# Patient Record
Sex: FEMALE | Race: WHITE | Employment: OTHER | URBAN - METROPOLITAN AREA
[De-identification: names, ages, dates, MRNs, and addresses within clinical notes are randomized per-mention and may not be internally consistent; named-entity substitution may affect disease eponyms.]

---

## 2017-07-14 ENCOUNTER — HOSPITAL ENCOUNTER (OUTPATIENT)
Dept: GENERAL RADIOLOGY | Age: 72
Discharge: HOME | End: 2017-07-14
Attending: INTERNAL MEDICINE
Payer: MEDICARE

## 2017-07-14 VITALS — WEIGHT: 125 LBS | BODY MASS INDEX: 22.15 KG/M2 | HEIGHT: 63 IN

## 2017-07-14 DIAGNOSIS — K21.9 GERD (GASTROESOPHAGEAL REFLUX DISEASE): ICD-10-CM

## 2017-07-14 DIAGNOSIS — R10.9 ABDOMINAL PAIN: ICD-10-CM

## 2017-07-14 PROCEDURE — 74011000250 HC RX REV CODE- 250: Performed by: INTERNAL MEDICINE

## 2017-07-14 PROCEDURE — 74245 XR UGI/BA SWALLOW W SM BOWEL: CPT

## 2017-07-14 PROCEDURE — 74011000255 HC RX REV CODE- 255: Performed by: INTERNAL MEDICINE

## 2017-07-14 RX ADMIN — ANTACID/ANTIFLATULENT 4 G: 380; 550; 10; 10 GRANULE, EFFERVESCENT ORAL at 09:38

## 2017-07-14 RX ADMIN — BARIUM SULFATE 135 ML: 980 POWDER, FOR SUSPENSION ORAL at 09:37

## 2017-07-14 RX ADMIN — BARIUM SULFATE 55 ML: 0.6 SUSPENSION ORAL at 09:38

## 2017-07-14 RX ADMIN — BARIUM SULFATE 200 ML: 240 SUSPENSION ORAL at 10:30

## 2017-07-26 ENCOUNTER — HOSPITAL ENCOUNTER (OUTPATIENT)
Dept: MAMMOGRAPHY | Age: 72
Discharge: HOME | End: 2017-07-26
Attending: OBSTETRICS & GYNECOLOGY
Payer: MEDICARE

## 2017-07-26 DIAGNOSIS — Z12.31 VISIT FOR SCREENING MAMMOGRAM: ICD-10-CM

## 2017-07-26 PROCEDURE — 77067 SCR MAMMO BI INCL CAD: CPT

## 2017-12-22 PROBLEM — K31.A0 INTESTINAL METAPLASIA OF GASTRIC MUCOSA: Status: ACTIVE | Noted: 2017-12-22

## 2018-06-25 ENCOUNTER — HOSPITAL ENCOUNTER (OUTPATIENT)
Dept: ULTRASOUND IMAGING | Age: 73
Discharge: HOME | End: 2018-06-25
Attending: INTERNAL MEDICINE
Payer: MEDICARE

## 2018-06-25 DIAGNOSIS — Q61.02 MULTIPLE RENAL CYSTS: ICD-10-CM

## 2018-06-25 PROCEDURE — 76770 US EXAM ABDO BACK WALL COMP: CPT

## 2018-06-26 NOTE — PROGRESS NOTES
Right renal angiomyolipomas enlarged slightly. Other stable. Will need to check annually but nothing to do this at this point.

## 2018-07-10 ENCOUNTER — APPOINTMENT (OUTPATIENT)
Dept: GENERAL RADIOLOGY | Age: 73
End: 2018-07-10
Payer: MEDICARE

## 2018-07-10 ENCOUNTER — APPOINTMENT (OUTPATIENT)
Dept: ULTRASOUND IMAGING | Age: 73
End: 2018-07-10
Payer: MEDICARE

## 2018-07-10 ENCOUNTER — HOSPITAL ENCOUNTER (EMERGENCY)
Age: 73
Discharge: HOME | End: 2018-07-10
Payer: MEDICARE

## 2018-07-10 VITALS
DIASTOLIC BLOOD PRESSURE: 85 MMHG | HEART RATE: 75 BPM | OXYGEN SATURATION: 98 % | HEIGHT: 60 IN | RESPIRATION RATE: 18 BRPM | SYSTOLIC BLOOD PRESSURE: 185 MMHG | WEIGHT: 120.3 LBS | BODY MASS INDEX: 23.62 KG/M2 | TEMPERATURE: 98 F

## 2018-07-10 DIAGNOSIS — K80.20 CHOLELITHIASIS WITHOUT CHOLECYSTITIS: Primary | ICD-10-CM

## 2018-07-10 LAB
ALBUMIN SERPL-MCNC: 3.5 G/DL (ref 3.2–4.6)
ALBUMIN/GLOB SERPL: 1 {RATIO} (ref 1.2–3.5)
ALP SERPL-CCNC: 74 U/L (ref 50–136)
ALT SERPL-CCNC: 27 U/L (ref 12–65)
ANION GAP SERPL CALC-SCNC: 5 MMOL/L (ref 7–16)
AST SERPL-CCNC: 24 U/L (ref 15–37)
BASOPHILS # BLD: 0 K/UL (ref 0–0.2)
BASOPHILS NFR BLD: 0 % (ref 0–2)
BILIRUB SERPL-MCNC: 0.3 MG/DL (ref 0.2–1.1)
BUN SERPL-MCNC: 23 MG/DL (ref 8–23)
CALCIUM SERPL-MCNC: 10.1 MG/DL (ref 8.3–10.4)
CHLORIDE SERPL-SCNC: 102 MMOL/L (ref 98–107)
CO2 SERPL-SCNC: 34 MMOL/L (ref 21–32)
CREAT SERPL-MCNC: 1.12 MG/DL (ref 0.6–1)
D DIMER PPP FEU-MCNC: 0.36 UG/ML(FEU)
DIFFERENTIAL METHOD BLD: ABNORMAL
EOSINOPHIL # BLD: 0.1 K/UL (ref 0–0.8)
EOSINOPHIL NFR BLD: 1 % (ref 0.5–7.8)
ERYTHROCYTE [DISTWIDTH] IN BLOOD BY AUTOMATED COUNT: 13.2 % (ref 11.9–14.6)
GLOBULIN SER CALC-MCNC: 3.5 G/DL (ref 2.3–3.5)
GLUCOSE SERPL-MCNC: 115 MG/DL (ref 65–100)
HCT VFR BLD AUTO: 39.7 % (ref 35.8–46.3)
HGB BLD-MCNC: 12.9 G/DL (ref 11.7–15.4)
IMM GRANULOCYTES # BLD: 0 K/UL (ref 0–0.5)
IMM GRANULOCYTES NFR BLD AUTO: 0 % (ref 0–5)
LIPASE SERPL-CCNC: 131 U/L (ref 73–393)
LYMPHOCYTES # BLD: 2 K/UL (ref 0.5–4.6)
LYMPHOCYTES NFR BLD: 22 % (ref 13–44)
MCH RBC QN AUTO: 30.2 PG (ref 26.1–32.9)
MCHC RBC AUTO-ENTMCNC: 32.5 G/DL (ref 31.4–35)
MCV RBC AUTO: 93 FL (ref 79.6–97.8)
MONOCYTES # BLD: 0.5 K/UL (ref 0.1–1.3)
MONOCYTES NFR BLD: 6 % (ref 4–12)
NEUTS SEG # BLD: 6.4 K/UL (ref 1.7–8.2)
NEUTS SEG NFR BLD: 71 % (ref 43–78)
PLATELET # BLD AUTO: 232 K/UL (ref 150–450)
PMV BLD AUTO: 9.5 FL (ref 10.8–14.1)
POTASSIUM SERPL-SCNC: 3.2 MMOL/L (ref 3.5–5.1)
PROT SERPL-MCNC: 7 G/DL (ref 6.3–8.2)
RBC # BLD AUTO: 4.27 M/UL (ref 4.05–5.25)
SODIUM SERPL-SCNC: 141 MMOL/L (ref 136–145)
TROPONIN I BLD-MCNC: 0 NG/ML (ref 0.02–0.05)
WBC # BLD AUTO: 9.1 K/UL (ref 4.3–11.1)

## 2018-07-10 PROCEDURE — 84484 ASSAY OF TROPONIN QUANT: CPT

## 2018-07-10 PROCEDURE — 74011250637 HC RX REV CODE- 250/637

## 2018-07-10 PROCEDURE — 71045 X-RAY EXAM CHEST 1 VIEW: CPT

## 2018-07-10 PROCEDURE — 99284 EMERGENCY DEPT VISIT MOD MDM: CPT

## 2018-07-10 PROCEDURE — 85025 COMPLETE CBC W/AUTO DIFF WBC: CPT

## 2018-07-10 PROCEDURE — 85379 FIBRIN DEGRADATION QUANT: CPT

## 2018-07-10 PROCEDURE — 76705 ECHO EXAM OF ABDOMEN: CPT

## 2018-07-10 PROCEDURE — 83690 ASSAY OF LIPASE: CPT

## 2018-07-10 PROCEDURE — 80053 COMPREHEN METABOLIC PANEL: CPT

## 2018-07-10 PROCEDURE — 81003 URINALYSIS AUTO W/O SCOPE: CPT

## 2018-07-10 RX ORDER — HYOSCYAMINE SULFATE 0.125 MG
125 TABLET ORAL
Qty: 10 TAB | Refills: 0 | Status: SHIPPED | OUTPATIENT
Start: 2018-07-10 | End: 2018-10-22 | Stop reason: ALTCHOICE

## 2018-07-10 RX ORDER — HYOSCYAMINE SULFATE 0.12 MG/1
0.12 TABLET SUBLINGUAL
Status: COMPLETED | OUTPATIENT
Start: 2018-07-10 | End: 2018-07-10

## 2018-07-10 RX ORDER — ONDANSETRON HYDROCHLORIDE 8 MG/1
8 TABLET, FILM COATED ORAL
Qty: 12 TAB | Refills: 0 | Status: SHIPPED | OUTPATIENT
Start: 2018-07-10 | End: 2018-10-22 | Stop reason: ALTCHOICE

## 2018-07-10 RX ORDER — HYDROCODONE BITARTRATE AND ACETAMINOPHEN 7.5; 325 MG/1; MG/1
1 TABLET ORAL
Qty: 12 TAB | Refills: 0 | Status: SHIPPED | OUTPATIENT
Start: 2018-07-10 | End: 2018-10-22 | Stop reason: ALTCHOICE

## 2018-07-10 RX ORDER — ONDANSETRON 2 MG/ML
4 INJECTION INTRAMUSCULAR; INTRAVENOUS
Status: DISCONTINUED | OUTPATIENT
Start: 2018-07-10 | End: 2018-07-10

## 2018-07-10 RX ORDER — ONDANSETRON HYDROCHLORIDE 8 MG/1
8 TABLET, FILM COATED ORAL
Qty: 12 TAB | Refills: 0 | Status: SHIPPED | OUTPATIENT
Start: 2018-07-10 | End: 2018-07-10

## 2018-07-10 RX ORDER — ONDANSETRON 2 MG/ML
4 INJECTION INTRAMUSCULAR; INTRAVENOUS ONCE
Qty: 1 VIAL | Refills: 0 | Status: SHIPPED
Start: 2018-07-10 | End: 2018-07-10

## 2018-07-10 RX ORDER — HYOSCYAMINE SULFATE 0.125 MG
125 TABLET ORAL
Qty: 10 TAB | Refills: 0 | Status: SHIPPED | OUTPATIENT
Start: 2018-07-10 | End: 2018-07-10

## 2018-07-10 RX ADMIN — HYOSCYAMINE SULFATE 0.12 MG: 0.12 TABLET ORAL; SUBLINGUAL at 06:05

## 2018-07-10 NOTE — ED NOTES
I have reviewed discharge instructions with the patient and spouse. The patient and spouse verbalized understanding. Patient left ED via Discharge Method: ambulatory to Home with ). Opportunity for questions and clarification provided. Patient given 3 scripts. To continue your aftercare when you leave the hospital, you may receive an automated call from our care team to check in on how you are doing. This is a free service and part of our promise to provide the best care and service to meet your aftercare needs.  If you have questions, or wish to unsubscribe from this service please call [phone]. Thank you for Choosing our [facility].

## 2018-07-10 NOTE — PROGRESS NOTES
Radiology reporting system down for upgrade. CXR imaging report for pcxr at 5:43am: \"No acute findings\".  Per Dr. Adam Galicia

## 2018-07-10 NOTE — DISCHARGE INSTRUCTIONS
Low-Fat Diet for Gallbladder Disease: Care Instructions  Your Care Instructions    When you eat, the gallbladder releases bile, which helps you digest the fat in food. If you have an inflamed gallbladder, this may cause pain. A low-fat diet may give your gallbladder a rest so you can start to heal. Your doctor and dietitian can help you make an eating plan that does not irritate your digestive system. Always talk with your doctor or dietitian before you make changes in your diet. Follow-up care is a key part of your treatment and safety. Be sure to make and go to all appointments, and call your doctor if you are having problems. It's also a good idea to know your test results and keep a list of the medicines you take. How can you care for yourself at home? · Eat many small meals and snacks each day instead of three large meals. · Choose lean meats. ¨ Eat no more than 5 to 6½ ounces of meat a day. ¨ Cut off all fat you can see. ¨ Eat chicken and turkey without the skin. ¨ Many types of fish, such as salmon, lake trout, tuna, and herring, provide healthy omega-3 fat. But, avoid fish canned in oil, such as sardines in olive oil. ¨ Bake, broil, or grill meats, poultry, or fish instead of frying them in butter or fat. · Drink or eat nonfat or low-fat milk, yogurt, cheese, or other milk products each day. ¨ Read the labels on cheeses, and choose those with less than 5 grams of fat an ounce. ¨ Try fat-free sour cream, cream cheese, or yogurt. ¨ Avoid cream soups and cream sauces on pasta. ¨ Eat low-fat ice cream, frozen yogurt, or sorbet. Avoid regular ice cream.  · Eat whole-grain cereals, breads, crackers, rice, or pasta. Avoid high-fat foods such as croissants, scones, biscuits, waffles, doughnuts, muffins, granola, and high-fat breads. · Flavor your foods with herbs and spices (such as basil, tarragon, or mint), fat-free sauces, or lemon juice instead of butter.  You can also use butter substitutes, fat-free mayonnaise, or fat-free dressing. · Try applesauce, prune puree, or mashed bananas to replace some or all of the fat when you bake. · Limit fats and oils, such as butter, margarine, mayonnaise, and salad dressing, to no more than 1 tablespoon a meal.  · Avoid high-fat foods, such as:  ¨ Chocolate, whole milk, ice cream, and processed cheese. ¨ Fried or buttered foods. ¨ Sausage, salami, and matos. ¨ Cinnamon rolls, cakes, pies, cookies, and other pastries. ¨ Prepared snack foods, such as potato chips, nut and granola bars, and mixed nuts. ¨ Coconut and avocado. · Learn how to read food labels for serving sizes and ingredients. Fast-food and convenience-food meals often have lots of fat. Where can you learn more? Go to http://jacintaSuperDerivativespower.info/. Enter M758 in the search box to learn more about \"Low-Fat Diet for Gallbladder Disease: Care Instructions. \"  Current as of: May 12, 2017  Content Version: 11.4  © 9270-7920 [employer]. Care instructions adapted under license by [employer] (which disclaims liability or warranty for this information). If you have questions about a medical condition or this instruction, always ask your healthcare professional. [place] [address] any warranty or liability for your use of this information. Learning About Gallstones  What are gallstones? Gallstones are stones that form in the gallbladder. The gallbladder is a small sac located just under the liver. It stores bile released by the liver. Bile helps you digest fats. Gallstones can be smaller than a grain of sand or as large as a golf ball. Gallstones form when cholesterol and other things found in bile make stones. They can also form if the gallbladder doesn't empty as it should. Gallstones can also form in the common bile duct or cystic duct. These tubes carry bile from the gallbladder and the liver to the small intestine.   What happens when you have gallstones? Gallstones can cause many different problems, such as:  · A blockage in the common bile duct. · Inflammation or infection of the gallbladder (acute cholecystitis). This can happen when a gallstone blocks the cystic duct. · Inflammation or infection of the common bile duct (cholangitis). This can happen when gallstones get stuck in the common bile duct. In rare cases, this can damage the liver or spread infection. · Pancreatitis, an inflammation of the pancreas. What are the symptoms? · Most people who have gallstones don't have symptoms. · Symptoms can include:  ¨ Mild pain in the pit of your stomach or in the upper right part of your belly. It may spread to your right upper back or shoulder blade area. ¨ Severe pain that may come and go or be steady. It may get worse when you eat. ¨ Fever and chills, if a gallstone is blocking a bile duct and causing an infection. ¨ Yellowing of your skin and the whites of your eyes. How can you prevent gallstones? There is no sure way to prevent gallstones. But you can reduce your risk of forming gallstones that can cause symptoms. · Stay at a healthy weight. If you need to lose weight, do so slowly and sensibly. · Eat regular, balanced meals. · Be active, and exercise regularly. How are gallstones treated? · If you don't have symptoms, you probably don't need treatment. · For mild symptoms, your doctor may have you take pain medicine and wait to see if the pain goes away. · For severe pain or infection, or if you have more than one gallstone attack, your doctor may suggest surgery to have your gallbladder removed. The body works fine without a gallbladder. Follow-up care is a key part of your treatment and safety. Be sure to make and go to all appointments, and call your doctor if you are having problems. It's also a good idea to know your test results and keep a list of the medicines you take. Where can you learn more?   Go to http://jalyn.info/. Enter  in the search box to learn more about \"Learning About Gallstones. \"  Current as of: May 12, 2017  Content Version: 11.4  © 7521-1964 [employer]. Care instructions adapted under license by [employer] (which disclaims liability or warranty for this information). If you have questions about a medical condition or this instruction, always ask your healthcare professional. [place] [address] any warranty or liability for your use of this information.

## 2018-07-10 NOTE — ED PROVIDER NOTES
HPI Comments: 76-year-old female was awakened from sleep this morning with right subscapular chest pain and right upper quadrant pain. Patient thought she needs to burp and it got Gas-X which helped some. She had this off-and-on for several days now    Patient is a 75 y.o. female presenting with abdominal pain. The history is provided by the patient. Abdominal Pain    This is a new problem. The current episode started more than 2 days ago. The problem occurs every several days. The pain is located in the RUQ. The quality of the pain is aching. The pain is at a severity of 8/10. The pain is moderate. Associated symptoms include nausea. Pertinent negatives include no diarrhea, no hematochezia, no melena and no constipation. Nothing worsens the pain. The pain is relieved by nothing.         Past Medical History:   Diagnosis Date    Anemia     resolved    Angioleiomyoma     Arthritis     Bronchiectasis (HCC)     Bronchiectasis (HCC)     Cancer ([address]     skin, BCC    Chronic kidney disease     mild    Crohn's disease ([address]     s/p 2 surgeries    Depression     Dry eye     Fibromyalgia     Hypercholesterolemia     , HDL 55,     Hypertension     Multiple renal cysts     JASON (obstructive sleep apnea)     on cpap    Renal cysts, acquired, bilateral        Past Surgical History:   Procedure Laterality Date    HX  SECTION      2    HX COLECTOMY      Subtotal         Family History:   Problem Relation Age of Onset    Heart Disease Father     Hypertension Father     Arthritis-osteo Father     Alzheimer Mother     Cancer Maternal Grandmother      salivary glands    Bleeding Prob Sister      aneurysm    Breast Cancer Sister        Social History     Social History    Marital status:      Spouse name: N/A    Number of children: N/A    Years of education: N/A     Occupational History    retired      s/p teacher     Social History Main Topics    Smoking status: Never Smoker  Smokeless tobacco: Never Used    Alcohol use No    Drug use: No    Sexual activity: Yes     Partners: Male     Other Topics Concern    Seat Belt Yes    Self-Exams No     Social History Narrative    Denies physical or sexual abuse         ALLERGIES: Celebrex [celecoxib]; Cephalexin; Codeine; Pcn [penicillins]; and Prilosec [omeprazole]    Review of Systems   Constitutional: Negative. Negative for activity change. HENT: Negative. Eyes: Negative. Respiratory: Negative. Cardiovascular: Negative. Gastrointestinal: Positive for abdominal pain and nausea. Negative for constipation, diarrhea, hematochezia and melena. Genitourinary: Negative. Musculoskeletal: Negative. Skin: Negative. Neurological: Negative. Psychiatric/Behavioral: Negative. All other systems reviewed and are negative. Vitals:    07/10/18 0509   BP: (!) 188/92   Pulse: 70   Resp: 16   Temp: 98 °F (36.7 °C)   SpO2: 95%   Weight: 54.6 kg (120 lb 4.8 oz)   Height: 5' (1.524 m)            Physical Exam   Constitutional: She is oriented to person, place, and time. She appears well-developed and well-nourished. No distress. HENT:   Head: Normocephalic and atraumatic. Right Ear: External ear normal.   Left Ear: External ear normal.   Nose: Nose normal.   Mouth/Throat: Oropharynx is clear and moist. No oropharyngeal exudate. Eyes: Conjunctivae and EOM are normal. Pupils are equal, round, and reactive to light. Right eye exhibits no discharge. Left eye exhibits no discharge. No scleral icterus. Neck: Normal range of motion. Neck supple. No JVD present. No tracheal deviation present. Cardiovascular: Normal rate, regular rhythm and intact distal pulses. Pulmonary/Chest: Effort normal and breath sounds normal. No stridor. No respiratory distress. She has no wheezes. She exhibits no tenderness. Abdominal: Soft. Bowel sounds are normal. She exhibits no distension and no mass. There is tenderness. Musculoskeletal: Normal range of motion. She exhibits no edema or tenderness. Neurological: She is alert and oriented to person, place, and time. No cranial nerve deficit. Skin: Skin is warm and dry. No rash noted. She is not diaphoretic. No erythema. No pallor. Psychiatric: She has a normal mood and affect. Her behavior is normal. Thought content normal.   Nursing note and vitals reviewed. MDM  Number of Diagnoses or Management Options  Cholelithiasis without cholecystitis:   Diagnosis management comments: Discussed with surgery will see her in the office. Assessment: Cholelithiasis without cholecystitis or obstruction.        Amount and/or Complexity of Data Reviewed  Clinical lab tests: ordered and reviewed  Tests in the radiology section of CPT®: ordered and reviewed  Tests in the medicine section of CPT®: ordered and reviewed          ED Course       Procedures

## 2018-07-23 PROBLEM — K80.20 CALCULUS OF GALLBLADDER WITHOUT CHOLECYSTITIS: Status: ACTIVE | Noted: 2018-07-23

## 2018-07-31 ENCOUNTER — HOSPITAL ENCOUNTER (OUTPATIENT)
Dept: MAMMOGRAPHY | Age: 73
Discharge: HOME | End: 2018-07-31
Attending: OBSTETRICS & GYNECOLOGY
Payer: MEDICARE

## 2018-07-31 DIAGNOSIS — Z12.31 VISIT FOR SCREENING MAMMOGRAM: ICD-10-CM

## 2018-07-31 PROCEDURE — 77063 BREAST TOMOSYNTHESIS BI: CPT

## 2018-08-12 ENCOUNTER — HOSPITAL ENCOUNTER (OUTPATIENT)
Age: 73
Setting detail: OBSERVATION
Discharge: HOME | End: 2018-08-15
Attending: EMERGENCY MEDICINE | Admitting: SURGERY
Payer: MEDICARE

## 2018-08-12 DIAGNOSIS — K81.0 ACUTE CHOLECYSTITIS: Primary | ICD-10-CM

## 2018-08-12 LAB
ALBUMIN SERPL-MCNC: 3.7 G/DL (ref 3.2–4.6)
ALBUMIN/GLOB SERPL: 0.9 {RATIO} (ref 1.2–3.5)
ALP SERPL-CCNC: 83 U/L (ref 50–136)
ALT SERPL-CCNC: 29 U/L (ref 12–65)
ANION GAP SERPL CALC-SCNC: 10 MMOL/L (ref 7–16)
AST SERPL-CCNC: 29 U/L (ref 15–37)
BASOPHILS # BLD: 0.1 K/UL
BASOPHILS NFR BLD: 0 % (ref 0–2)
BILIRUB SERPL-MCNC: 0.5 MG/DL (ref 0.2–1.1)
BUN SERPL-MCNC: 22 MG/DL (ref 8–23)
CALCIUM SERPL-MCNC: 9.4 MG/DL (ref 8.3–10.4)
CHLORIDE SERPL-SCNC: 100 MMOL/L (ref 98–107)
CO2 SERPL-SCNC: 28 MMOL/L (ref 21–32)
CREAT SERPL-MCNC: 1.16 MG/DL (ref 0.6–1)
DIFFERENTIAL METHOD BLD: ABNORMAL
EOSINOPHIL # BLD: 0 K/UL
EOSINOPHIL NFR BLD: 0 % (ref 0.5–7.8)
ERYTHROCYTE [DISTWIDTH] IN BLOOD BY AUTOMATED COUNT: 12.5 %
GLOBULIN SER CALC-MCNC: 4.1 G/DL (ref 2.3–3.5)
GLUCOSE SERPL-MCNC: 118 MG/DL (ref 65–100)
HCT VFR BLD AUTO: 40.5 % (ref 35.8–46.3)
HGB BLD-MCNC: 13.1 G/DL (ref 11.7–15.4)
IMM GRANULOCYTES # BLD: 0.1 K/UL
IMM GRANULOCYTES NFR BLD AUTO: 0 % (ref 0–5)
LIPASE SERPL-CCNC: 116 U/L (ref 73–393)
LYMPHOCYTES # BLD: 2 K/UL
LYMPHOCYTES NFR BLD: 13 % (ref 13–44)
MCH RBC QN AUTO: 30.8 PG (ref 26.1–32.9)
MCHC RBC AUTO-ENTMCNC: 32.3 G/DL (ref 31.4–35)
MCV RBC AUTO: 95.3 FL (ref 79.6–97.8)
MONOCYTES # BLD: 0.7 K/UL
MONOCYTES NFR BLD: 4 % (ref 4–12)
NEUTS SEG # BLD: 12.9 K/UL
NEUTS SEG NFR BLD: 82 % (ref 43–78)
NRBC # BLD: 0 K/UL (ref 0–0.2)
PLATELET # BLD AUTO: 232 K/UL (ref 150–450)
PMV BLD AUTO: 9.4 FL (ref 9.4–12.3)
POTASSIUM SERPL-SCNC: 3.4 MMOL/L (ref 3.5–5.1)
PROT SERPL-MCNC: 7.8 G/DL (ref 6.3–8.2)
RBC # BLD AUTO: 4.25 M/UL (ref 4.05–5.2)
SODIUM SERPL-SCNC: 138 MMOL/L (ref 136–145)
WBC # BLD AUTO: 15.7 K/UL (ref 4.3–11.1)

## 2018-08-12 PROCEDURE — 81003 URINALYSIS AUTO W/O SCOPE: CPT | Performed by: EMERGENCY MEDICINE

## 2018-08-12 PROCEDURE — 99285 EMERGENCY DEPT VISIT HI MDM: CPT | Performed by: EMERGENCY MEDICINE

## 2018-08-12 PROCEDURE — 85025 COMPLETE CBC W/AUTO DIFF WBC: CPT

## 2018-08-12 PROCEDURE — 83690 ASSAY OF LIPASE: CPT

## 2018-08-12 PROCEDURE — 80053 COMPREHEN METABOLIC PANEL: CPT

## 2018-08-12 NOTE — IP AVS SNAPSHOT
[address] 
[phone] Patient: Brooke Gonzalez MRN: [id number] LAT:6/09/2991 A check amy indicates which time of day the medication should be taken. My Medications CHANGE how you take these medications Instructions Each Dose to Equal  
 Morning Noon Evening Bedtime  
 clorazepate 3.75 mg tablet Commonly known as:  TRANXENE What changed:  when to take this Your last dose was: Your next dose is: Take 1 Tab by mouth two (2) times daily as needed. Max Daily Amount: 7.5 mg.  
 3.75 mg  
    
   
   
   
  
 * HYDROcodone-acetaminophen 7.5-325 mg per tablet Commonly known as:  Wayna Glaze What changed:  Another medication with the same name was added. Make sure you understand how and when to take each. Your last dose was: Your next dose is: Take 1 Tab by mouth every six (6) hours as needed for Pain. Max Daily Amount: 4 Tabs. 1 Tab  
    
   
   
   
  
 * HYDROcodone-acetaminophen 5-325 mg per tablet Commonly known as:  Wayna Glaze What changed: You were already taking a medication with the same name, and this prescription was added. Make sure you understand how and when to take each. Your last dose was: Your next dose is: Take 1 Tab by mouth every four (4) hours as needed for Pain. Max Daily Amount: 6 Tabs. 1 Tab  
    
   
   
   
  
 * HYDROcodone-acetaminophen 5-325 mg per tablet Commonly known as:  Wayna Glaze What changed: You were already taking a medication with the same name, and this prescription was added. Make sure you understand how and when to take each. Your last dose was: Your next dose is: Take 1 Tab by mouth every four (4) hours as needed for Pain. Max Daily Amount: 6 Tabs. 1 Tab * Notice:   This list has 3 medication(s) that are the same as other medications prescribed for you. Read the directions carefully, and ask your doctor or other care provider to review them with you. CONTINUE taking these medications Instructions Each Dose to Equal  
 Morning Noon Evening Bedtime  
 acetaminophen 500 mg tablet Commonly known as:  TYLENOL Your last dose was: Your next dose is: Take 1,000 mg by mouth daily as needed for Pain. 1000 mg  
    
   
   
   
  
 cholecalciferol (VITAMIN D3) 5,000 unit Tab tablet Commonly known as:  VITAMIN D3 Your last dose was: Your next dose is: Take  by mouth daily. cyanocobalamin 500 mcg tablet Commonly known as:  VITAMIN B12 Your last dose was: Your next dose is: Take 500 mcg by mouth daily. 500 mcg  
    
   
   
   
  
 desvenlafaxine succinate 50 mg ER tablet Commonly known as:  PRISTIQ Your last dose was: Your next dose is: Take 1 Tab by mouth daily. 50 mg DULoxetine 30 mg capsule Commonly known as:  CYMBALTA Your last dose was: Your next dose is: Take 1 Cap by mouth daily. 30 mg  
    
   
   
   
  
 fluticasone 50 mcg/actuation nasal spray Commonly known as:  Homar Proctor Your last dose was: Your next dose is: 2 Sprays by Both Nostrils route daily. 2 Spray  
    
   
   
   
  
 hyoscyamine 0.125 mg tablet Commonly known as:  Maral Linares Your last dose was: Your next dose is: Take 1 Tab by mouth every four (4) hours as needed for Cramping. 125 mcg  
    
   
   
   
  
 lisinopril-hydroCHLOROthiazide 10-12.5 mg per tablet Commonly known as:  Mari Huitron Your last dose was: Your next dose is: Take 1 Tab by mouth daily. 1 Tab  
    
   
   
   
  
 ondansetron hcl 8 mg tablet Commonly known as:  Lina Sanchez  
   
 Your last dose was: Your next dose is: Take 1 Tab by mouth every eight (8) hours as needed for Nausea. 8 mg PEPCID 20 mg tablet Generic drug:  famotidine Your last dose was: Your next dose is: Take 20 mg by mouth daily. 20 mg  
    
   
   
   
  
 rosuvastatin 10 mg tablet Commonly known as:  CRESTOR Your last dose was: Your next dose is: Take 1 Tab by mouth nightly. 10 mg  
    
   
   
   
  
 zolpidem 5 mg tablet Commonly known as:  AMBIEN Your last dose was: Your next dose is: Take 1 Tab by mouth nightly. Max Daily Amount: 5 mg.  
 5 mg Where to Get Your Medications Information on where to get these meds will be given to you by the nurse or doctor. ! Ask your nurse or doctor about these medications HYDROcodone-acetaminophen 5-325 mg per tablet HYDROcodone-acetaminophen 5-325 mg per tablet

## 2018-08-12 NOTE — IP AVS SNAPSHOT
[address] 
[phone] Patient: Zaire Elias MRN: [id number] RWB:[id number] About your hospitalization You were admitted on:  August 13, 2018 You last received care in the:  [address] [place] [address] You were discharged on:  August 15, 2018 Why you were hospitalized Your primary diagnosis was:  Biliary Calculus With Acute Cholecystitis Follow-up Information Follow up With Details Comments Contact Mando Evans, DO In 2 weeks APPT. SEPT. 4th @ 9:30 [address] [facility] [address] 
[phone] Isaiah Phillips MD   [address] [facility] [address] 
[phone] Your Scheduled Appointments Tuesday September 04, 2018  9:30 AM EDT Global Post Op with LEENA Keller  
[place] [facility] ([address] [place] [address]) [address] [place] [id number] [phone] Discharge Orders None A check amy indicates which time of day the medication should be taken. My Medications CHANGE how you take these medications Instructions Each Dose to Equal  
 Morning Noon Evening Bedtime  
 clorazepate 3.75 mg tablet Commonly known as:  TRANXENE What changed:  when to take this Your last dose was: Your next dose is: Take 1 Tab by mouth two (2) times daily as needed. Max Daily Amount: 7.5 mg.  
 3.75 mg  
    
   
   
   
  
 * HYDROcodone-acetaminophen 7.5-325 mg per tablet Commonly known as:  Benetta Pock What changed:  Another medication with the same name was added. Make sure you understand how and when to take each. Your last dose was: Your next dose is: Take 1 Tab by mouth every six (6) hours as needed for Pain. Max Daily Amount: 4 Tabs. 1 Tab  
    
   
   
   
  
 * HYDROcodone-acetaminophen 5-325 mg per tablet Commonly known as:  Tiff Sales What changed: You were already taking a medication with the same name, and this prescription was added. Make sure you understand how and when to take each. Your last dose was: Your next dose is: Take 1 Tab by mouth every four (4) hours as needed for Pain. Max Daily Amount: 6 Tabs. 1 Tab  
    
   
   
   
  
 * HYDROcodone-acetaminophen 5-325 mg per tablet Commonly known as:  Tiff Sales What changed: You were already taking a medication with the same name, and this prescription was added. Make sure you understand how and when to take each. Your last dose was: Your next dose is: Take 1 Tab by mouth every four (4) hours as needed for Pain. Max Daily Amount: 6 Tabs. 1 Tab * Notice: This list has 3 medication(s) that are the same as other medications prescribed for you. Read the directions carefully, and ask your doctor or other care provider to review them with you. CONTINUE taking these medications Instructions Each Dose to Equal  
 Morning Noon Evening Bedtime  
 acetaminophen 500 mg tablet Commonly known as:  TYLENOL Your last dose was: Your next dose is: Take 1,000 mg by mouth daily as needed for Pain. 1000 mg  
    
   
   
   
  
 cholecalciferol (VITAMIN D3) 5,000 unit Tab tablet Commonly known as:  VITAMIN D3 Your last dose was: Your next dose is: Take  by mouth daily. cyanocobalamin 500 mcg tablet Commonly known as:  VITAMIN B12 Your last dose was: Your next dose is: Take 500 mcg by mouth daily. 500 mcg  
    
   
   
   
  
 desvenlafaxine succinate 50 mg ER tablet Commonly known as:  PRISTIQ Your last dose was: Your next dose is: Take 1 Tab by mouth daily. 50 mg DULoxetine 30 mg capsule Commonly known as:  CYMBALTA Your last dose was: Your next dose is: Take 1 Cap by mouth daily. 30 mg  
    
   
   
   
  
 fluticasone 50 mcg/actuation nasal spray Commonly known as:  Aaron De La Cruz Your last dose was: Your next dose is: 2 Sprays by Both Nostrils route daily. 2 Spray  
    
   
   
   
  
 hyoscyamine 0.125 mg tablet Commonly known as:  Margsamuela Lena[place]e Your last dose was: Your next dose is: Take 1 Tab by mouth every four (4) hours as needed for Cramping. 125 mcg  
    
   
   
   
  
 lisinopril-hydroCHLOROthiazide 10-12.5 mg per tablet Commonly known as:  Janny Trenton Your last dose was: Your next dose is: Take 1 Tab by mouth daily. 1 Tab  
    
   
   
   
  
 ondansetron hcl 8 mg tablet Commonly known as:  Liseth Rao Your last dose was: Your next dose is: Take 1 Tab by mouth every eight (8) hours as needed for Nausea. 8 mg PEPCID 20 mg tablet Generic drug:  famotidine Your last dose was: Your next dose is: Take 20 mg by mouth daily. 20 mg  
    
   
   
   
  
 rosuvastatin 10 mg tablet Commonly known as:  CRESTOR Your last dose was: Your next dose is: Take 1 Tab by mouth nightly. 10 mg  
    
   
   
   
  
 zolpidem 5 mg tablet Commonly known as:  AMBIEN Your last dose was: Your next dose is: Take 1 Tab by mouth nightly. Max Daily Amount: 5 mg.  
 5 mg Where to Get Your Medications Information on where to get these meds will be given to you by the nurse or doctor. ! Ask your nurse or doctor about these medications HYDROcodone-acetaminophen 5-325 mg per tablet HYDROcodone-acetaminophen 5-325 mg per tablet Opioid Education Prescription Opioids: What You Need to Know: Prescription opioids can be used to help relieve moderate-to-severe pain and are often prescribed following a surgery or injury, or for certain health conditions. These medications can be an important part of treatment but also come with serious risks. Opioids are strong pain medicines. Examples include hydrocodone, oxycodone, fentanyl, and morphine. Heroin is an example of an illegal opioid. It is important to work with your health care provider to make sure you are getting the safest, most effective care. WHAT ARE THE RISKS AND SIDE EFFECTS OF OPIOID USE? Prescription opioids carry serious risks of addiction and overdose, especially with prolonged use. An opioid overdose, often marked by slow breathing, can cause sudden death. The use of prescription opioids can have a number of side effects as well, even when taken as directed. · Tolerance-meaning you might need to take more of a medication for the same pain relief · Physical dependence-meaning you have symptoms of withdrawal when the medication is stopped. Withdrawal symptoms can include nausea, sweating, chills, diarrhea, stomach cramps, and muscle aches. Withdrawal can last up to several weeks, depending on which drug you took and how long you took it. · Increased sensitivity to pain · Constipation · Nausea, vomiting, and dry mouth · Sleepiness and dizziness · Confusion · Depression · Low levels of testosterone that can result in lower sex drive, energy, and strength · Itching and sweating RISKS ARE GREATER WITH:      
· History of drug misuse, substance use disorder, or overdose · Mental health conditions (such as depression or anxiety) · Sleep apnea · Older age (72 years or older) · Pregnancy Avoid alcohol while taking prescription opioids. Also, unless specifically advised by your health care provider, medications to avoid include: · Benzodiazepines (such as Xanax or Valium) · Muscle relaxants (such as Soma or Flexeril) · Hypnotics (such as Ambien or Lunesta) · Other prescription opioids KNOW YOUR OPTIONS Talk to your health care provider about ways to manage your pain that don't involve prescription opioids. Some of these options may actually work better and have fewer risks and side effects. Options may include: 
· Pain relievers such as acetaminophen, ibuprofen, and naproxen · Some medications that are also used for depression or seizures · Physical therapy and exercise · Counseling to help patients learn how to cope better with triggers of pain and stress. · Application of heat or cold compress · Massage therapy · Relaxation techniques Be Informed Make sure you know the name of your medication, how much and how often to take it, and its potential risks & side effects. IF YOU ARE PRESCRIBED OPIOIDS FOR PAIN: 
· Never take opioids in greater amounts or more often than prescribed. Remember the goal is not to be pain-free but to manage your pain at a tolerable level. · Follow up with your primary care provider to: · Work together to create a plan on how to manage your pain. · Talk about ways to help manage your pain that don't involve prescription opioids. · Talk about any and all concerns and side effects. · Help prevent misuse and abuse. · Never sell or share prescription opioids · Help prevent misuse and abuse. · Store prescription opioids in a secure place and out of reach of others (this may include visitors, children, friends, and family). · Safely dispose of unused/unwanted prescription opioids: Find your community drug take-back program or your pharmacy mail-back program, or flush them down the toilet, following guidance from the Food and Drug Administration (www.fda.gov/Drugs/ResourcesForYou). · Visit www.cdc.gov/drugoverdose to learn about the risks of opioid abuse and overdose.  
· If you believe you may be struggling with addiction, tell your health care provider and ask for guidance or call [facility] L[facility] at 5-341-451-WHSX. Discharge Instructions DISCHARGE SUMMARY from Nurse PATIENT INSTRUCTIONS: 
 
After general anesthesia or intravenous sedation, for 24 hours or while taking prescription Narcotics: · Limit your activities · Do not drive and operate hazardous machinery · Do not make important personal or business decisions · Do  not drink alcoholic beverages · If you have not urinated within 8 hours after discharge, please contact your surgeon on call. Report the following to your surgeon: 
· Excessive pain, swelling, redness or odor of or around the surgical area · Temperature over 100.5 · Nausea and vomiting lasting longer than 4 hours or if unable to take medications · Any signs of decreased circulation or nerve impairment to extremity: change in color, persistent  numbness, tingling, coldness or increase pain · Any questions What to do at Home: 
Recommended activity: Activity as tolerated, see MD order below. If you experience any of the following symptoms nausea, vomiting, chest pain, bleeding, uncontrolled pain, please follow up with MD. 
 
*  Please give a list of your current medications to your Primary Care Provider. *  Please update this list whenever your medications are discontinued, doses are 
    changed, or new medications (including over-the-counter products) are added. *  Please carry medication information at all times in case of emergency situations. These are general instructions for a healthy lifestyle: No smoking/ No tobacco products/ Avoid exposure to second hand smoke Surgeon General's Warning:  Quitting smoking now greatly reduces serious risk to your health. Obesity, smoking, and sedentary lifestyle greatly increases your risk for illness A healthy diet, regular physical exercise & weight monitoring are important for maintaining a healthy lifestyle You may be retaining fluid if you have a history of heart failure or if you experience any of the following symptoms:  Weight gain of 3 pounds or more overnight or 5 pounds in a week, increased swelling in our hands or feet or shortness of breath while lying flat in bed. Please call your doctor as soon as you notice any of these symptoms; do not wait until your next office visit. Recognize signs and symptoms of STROKE: 
 
F-face looks uneven A-arms unable to move or move unevenly S-speech slurred or non-existent T-time-call 911 as soon as signs and symptoms begin-DO NOT go Back to bed or wait to see if you get better-TIME IS BRAIN. Warning Signs of HEART ATTACK Call 911 if you have these symptoms: 
? Chest discomfort. Most heart attacks involve discomfort in the center of the chest that lasts more than a few minutes, or that goes away and comes back. It can feel like uncomfortable pressure, squeezing, fullness, or pain. ? Discomfort in other areas of the upper body. Symptoms can include pain or discomfort in one or both arms, the back, neck, jaw, or stomach. ? Shortness of breath with or without chest discomfort. ? Other signs may include breaking out in a cold sweat, nausea, or lightheadedness. Don't wait more than five minutes to call [address]! Fast action can save your life. Calling 911 is almost always the fastest way to get lifesaving treatment. Emergency Medical Services staff can begin treatment when they arrive  up to an hour sooner than if someone gets to the hospital by car. The discharge information has been reviewed with the patient. The patient verbalized understanding. Discharge medications reviewed with the patient and appropriate educational materials and side effects teaching were provided.  
___________________________________________________________________________ ________________________________________________________ Post op instructions: 
1. May shower only, no tub bathing, no hot tub, no swimming. 2. Keep incision clean with regular soap and water. 3. No lifting over 10 lbs. 4. Regular diet as tolerated. 5. May remove topical dressing on Day #2. Leave steri strips until seen in Dr. Evans's office at follow up appointment. 6. No driving on pain medicines. 7. Resume home medicines as usual.  
 
Edison Evans, DO Cholecystectomy: What to Expect at [facility] Your Recovery After your surgery, it is normal to feel weak and tired for several days after you return home. Your belly may be swollen. If you had laparoscopic surgery, you may also have pain in your shoulder for about 24 hours. You may have gas or need to burp a lot at first, and a few people get diarrhea. The diarrhea usually goes away in 2 to 4 weeks, but it may last longer. How quickly you recover depends on whether you had a laparoscopic or open surgery. · For a laparoscopic surgery, most people can go back to work or their normal routine in 1 to 2 weeks, but it may take longer, depending on the type of work you do. · For an open surgery, it will probably take 4 to 6 weeks before you get back to your normal routine. This care sheet gives you a general idea about how long it will take for you to recover. However, each person recovers at a different pace. Follow the steps below to get better as quickly as possible. How can you care for yourself at home? Activity 
  · Rest when you feel tired. Getting enough sleep will help you recover.  
  · Try to walk each day. Start out by walking a little more than you did the day before. Gradually increase the amount you walk. Walking boosts blood flow and helps prevent pneumonia and constipation.  
  · For about 2 to 4 weeks, avoid lifting anything that would make you strain.  This may include a child, heavy grocery bags and milk containers, a heavy briefcase or backpack, cat litter or dog food bags, or a vacuum .  
  · Avoid strenuous activities, such as biking, jogging, weightlifting, and aerobic exercise, until your doctor says it is okay.  
  · You may shower 24 to 48 hours after surgery, if your doctor okays it. Pat the cut (incision) dry. Do not take a bath for the first 2 weeks, or until your doctor tells you it is okay.  
  · You may drive when you are no longer taking pain medicine and can quickly move your foot from the gas pedal to the brake. You must also be able to sit comfortably for a long period of time, even if you do not plan to go far. You might get caught in traffic.  
  · For a laparoscopic surgery, most people can go back to work or their normal routine in 1 to 2 weeks, but it may take longer. For an open surgery, it will probably take 4 to 6 weeks before you get back to your normal routine.  
  · Your doctor will tell you when you can have sex again.  
 Diet 
  · Eat smaller meals more often instead of fewer larger meals. You can eat a normal diet, but avoid eating fatty foods for about 1 month. Fatty foods include hamburger, whole milk, cheese, and many snack foods. If your stomach is upset, try bland, low-fat foods like plain rice, broiled chicken, toast, and yogurt.  
  · Drink plenty of fluids (unless your doctor tells you not to).   · If you have diarrhea, try avoiding spicy foods, dairy products, fatty foods, and alcohol. You can also watch to see if specific foods cause it, and stop eating them. If the diarrhea continues for more than 2 weeks, talk to your doctor.  
  · You may notice that your bowel movements are not regular right after your surgery. This is common. Try to avoid constipation and straining with bowel movements. You may want to take a fiber supplement every day. If you have not had a bowel movement after a couple of days, ask your doctor about taking a mild laxative. Medicines   · Your doctor will tell you if and when you can restart your medicines. He or she will also give you instructions about taking any new medicines.  
  · If you take blood thinners, such as warfarin (Coumadin), clopidogrel (Plavix), or aspirin, be sure to talk to your doctor. He or she will tell you if and when to start taking those medicines again. Make sure that you understand exactly what your doctor wants you to do.  
  · Take pain medicines exactly as directed. ¨ If the doctor gave you a prescription medicine for pain, take it as prescribed. ¨ If you are not taking a prescription pain medicine, take an over-the-counter medicine such as acetaminophen (Tylenol), ibuprofen (Advil, Motrin), or naproxen (Aleve). Read and follow all instructions on the label. ¨ Do not take two or more pain medicines at the same time unless the doctor told you to. Many pain medicines contain acetaminophen, which is Tylenol. Too much Tylenol can be harmful.  
  · If you think your pain medicine is making you sick to your stomach: 
¨ Take your medicine after meals (unless your doctor tells you not to). ¨ Ask your doctor for a different pain medicine.  
  · If your doctor prescribed antibiotics, take them as directed. Do not stop taking them just because you feel better. You need to take the full course of antibiotics. Incision care 
  · If you have strips of tape on the incision, or cut, leave the tape on for a week or until it falls off.  
  · After 24 to 48 hours, wash the area daily with warm, soapy water, and pat it dry.  
  · You may have staples to hold the cut together. Keep them dry until your doctor takes them out. This is usually in 7 to 10 days.  
  · Keep the area clean and dry. You may cover it with a gauze bandage if it weeps or rubs against clothing.  Change the bandage every day.  
 Ice 
  · To reduce swelling and pain, put ice or a cold pack on your belly for 10 to 20 minutes at a time. Do this every 1 to 2 hours. Put a thin cloth between the ice and your skin. Follow-up care is a key part of your treatment and safety. Be sure to make and go to all appointments, and call your doctor if you are having problems. It's also a good idea to know your test results and keep a list of the medicines you take. When should you call for help? Call 911 anytime you think you may need emergency care. For example, call if: 
  · You passed out (lost consciousness).  
  · You are short of breath. Fernanda Caron your doctor now or seek immediate medical care if: 
  · You are sick to your stomach and cannot drink fluids.  
  · You have pain that does not get better when you take your pain medicine.  
  · You cannot pass stools or gas.  
  · You have signs of infection, such as: 
¨ Increased pain, swelling, warmth, or redness. ¨ Red streaks leading from the incision. ¨ Pus draining from the incision. ¨ A fever.  
  · Bright red blood has soaked through the bandage over your incision.  
  · You have loose stitches, or your incision comes open.  
  · You have signs of a blood clot in your leg (called a deep vein thrombosis), such as: 
¨ Pain in your calf, back of knee, thigh, or groin. ¨ Redness and swelling in your leg or groin.  
 Watch closely for any changes in your health, and be sure to contact your doctor if you have any problems. Where can you learn more? Go to http://jacinta-power.info/. Enter 692 96 326 in the search box to learn more about \"Cholecystectomy: What to Expect at Home. \" Current as of: May 12, 2017 Content Version: 11.7 © 0398-7015 [employer]. Care instructions adapted under license by [employer] (which disclaims liability or warranty for this information).  If you have questions about a medical condition or this instruction, always ask your healthcare professional. [employer] disclaims any warranty or liability for your use of this information. Secondhand Smoke: Care Instructions Your Care Instructions Secondhand smoke comes from the burning end of a cigarette, cigar, or pipe and the smoke that a smoker exhales. The smoke contains nicotine and many other harmful chemicals. Breathing secondhand smoke can cause or worsen health problems including cancer, asthma, coronary artery disease, and respiratory infections. It can make your eyes and nose burn and cause a sore throat. Secondhand smoke is especially bad for babies and young children whose lungs are still developing. Babies whose parents smoke are more likely to have ear infections, pneumonia, and bronchitis in the first few years of their lives. Secondhand smoke can make asthma symptoms worse in children. If you are pregnant, it is important that you not smoke and that you avoid secondhand smoke. You are more likely to give birth to a baby who weighs less than expected (low birth weight) if you smoke. And your baby may have a greater risk for sudden infant death syndrome (SIDS). Babies whose mothers are exposed to secondhand smoke during pregnancy have a higher risk for health problems. Follow-up care is a key part of your treatment and safety. Be sure to make and go to all appointments, and call your doctor if you are having problems. It's also a good idea to know your test results and keep a list of the medicines you take. How can you care for yourself at home? · Do not smoke or let anyone else smoke in your home. If people must smoke, ask them to go outside. · If people do smoke in your home, choose a room where you can open a window or use a fan to get the smoke outside. · Do not let anyone smoke in your car. If someone must smoke, pull over in a safe place and let him or her smoke away from the car. · Ask your employer to make sure that you have a smoke-free work area. · Make sure that your children are not exposed to secondhand smoke at day care, school, and after-school programs. · Try to choose nonsmoking bars, restaurants, and other public places when you go out. · Help your family and friends who smoke to quit by encouraging them to try. Tell them about treatment resources. Having support from others often helps. · If you smoke, quit. Quitting is hard, but there are ways to boost your chance of quitting tobacco for good. ¨ Use nicotine gum, patches, or lozenges. Call a quitline. Ask your doctor about stop-smoking programs and medicines. ¨ Keep trying. When should you call for help? Watch closely for changes in your health, and be sure to contact your doctor if you have any problems. Where can you learn more? Go to http://jacinta-power.info/. Enter L004 in the search box to learn more about \"Secondhand Smoke: Care Instructions. \" Current as of: November 29, 2017 Content Version: 11.7 © 7881-9404 [employer]. Care instructions adapted under license by [employer] (which disclaims liability or warranty for this information). If you have questions about a medical condition or this instruction, always ask your healthcare professional. [address] any warranty or liability for your use of this information. Hand-Washing: Care Instructions Your Care Instructions It is important for caregivers to wash their hands properly. This is the single best way to prevent the spread of infections. Hand-washing can help keep you from getting sick. It is easy, doesn't cost much, and it works. Make sure that you and your caregivers follow safe hand-washing routines. Caregivers may include health care workers or family members at home or in a care facility. You can talk to them about this information on hand-washing. Follow-up care is a key part of your treatment and safety.  Be sure to make and go to all appointments, and call your doctor if you are having problems. It's also a good idea to know your test results and keep a list of the medicines you take. How can you care for yourself at home? · Caregivers should wash their hands with soap and water: ¨ When their hands are dirty, especially after being exposed to body fluids. This includes blood. ¨ When their hands may have been exposed to germs that could spread infection. ¨ After they touch broken skin, sores, or wound bandages. ¨ After they use the bathroom. · At other times, caregivers can use an alcohol-based gel  or soap and water to clean hands. This should be done: ¨ Before and after any contact with you. ¨ After they take off gloves. ¨ Before they handle a device that touches your body (even if gloves are used). ¨ After they touch any objects near you, such as medical equipment, lights, or doorknobs. ¨ Before they handle medicine or prepare food. Proper hand-washing for caregivers · When using an alcohol-based gel , fill your palm with the gel. Then spread it all over your hands. Rub your hands together until they are dry. · When washing hands with soap and water: ¨ Wet your hands with running water, and apply soap. ¨ Rub your hands together to make a lather. Scrub well for at least 20 seconds. ¨ Pay special attention to your wrists, the backs of your hands, between your fingers, and under your fingernails. ¨ Rinse your hands well under running water. ¨ Use a clean towel to dry your hands, or air-dry your hands. You may want to use a clean towel as a barrier between the faucet and your clean hands when you turn off the water. · If you use bar soap, use small bars. Set the soap on a rack that lets water drain. Where can you learn more? Go to http://jacinta-power.info/. Enter S495 in the search box to learn more about \"Hand-Washing: Care Instructions. \" 
 Current as of: November 18, 2017 Content Version: 11.7 © 6819-0819 [employer]. Care instructions adapted under license by [employer] (which disclaims liability or warranty for this information). If you have questions about a medical condition or this instruction, always ask your healthcare professional. [address] any warranty or liability for your use of this information. ACO Transitions of Care Introducing [facility] [address] offers a voluntary care coordination program to provide high quality service and care to [facility] fee-for-service beneficiaries. Vinicio Reddynce was designed to help you enhance your health and well-being through the following services: ? Transitions of Care  support for individuals who are transitioning from one care setting to another (example: Hospital to home). ? Chronic and Complex Care Coordination  support for individuals and caregivers of those with serious or chronic illnesses or with more than one chronic (ongoing) condition and those who take a number of different medications. If you meet specific medical criteria, a [address] [place] [address] Rd may call you directly to coordinate your care with your primary care physician and your other care providers. For questions about the [facility] programs, please, contact your physicians office. For general questions or additional information about Accountable Care Organizations: 
Please visit www.medicare.gov/acos. html or call 1-800-MEDICARE (2-371.724.1717) TTY users should call 0-575.324.6142. Introducing [facility]! Dear Earnestine Mir: Thank you for requesting a boomtrain account. Our records indicate that you already have an active boomtrain account. You can access your account anytime at [email or web address] Travellert Did you know that you can access your hospital and ER discharge instructions at any time in SDI-Solution? You can also review all of your test results from your hospital stay or ER visit. Additional Information If you have questions, please visit the Frequently Asked Questions section of the Kraftwurxt website at [email or web address] Remember, SDI-Solution is NOT to be used for urgent needs. For medical emergencies, dial 911. Now available from your iPhone and Android! Introducing Anthony Shea As a [employer] patient, I wanted to make you aware of our electronic visit tool called Anthony Shea. Newzstand[employer] allows you to connect within minutes with a medical provider 24 hours a day, seven days a week via a mobile device or tablet or logging into a secure website from your computer. You can access Anthony Shea from anywhere in the [place]. A virtual visit might be right for you when you have a simple condition and feel like you just dont want to get out of bed, or cant get away from work for an appointment, when your regular [facility] provider is not available (evenings, weekends or holidays), or when youre out of town and need minor care. Electronic visits cost only $49 and if the Newzstand[employer] provider determines a prescription is needed to treat your condition, one can be electronically transmitted to a nearby pharmacy*. Please take a moment to enroll today if you have not already done so. The enrollment process is free and takes just a few minutes. To enroll, please download the Newzstand[employer] roseann to your tablet or phone, or visit [email or web address] to enroll on your computer. And, as an [address] [place] [address] patient with a InfiKno account, the results of your visits will be scanned into your electronic medical record and your primary care provider will be able to view the scanned results. We urge you to continue to see your regular [facility] provider for your ongoing medical care. And while your primary care provider may not be the one available when you seek a Vigor Pharmadionfin virtual visit, the peace of mind you get from getting a real diagnosis real time can be priceless. For more information on Vigor Pharmadionfin, view our Frequently Asked Questions (FAQs) at [email or web address]. Sincerely, 
 
Valentino Milks, MD 
Chief Medical Officer [address] *:  certain medications cannot be prescribed via Vigor Pharmadionfin Providers Seen During Your Hospitalization Provider Specialty Primary office phone Deandre Randall MD Emergency Medicine [phone] Maria Victoria Che MD General Surgery [phone] Ericjackelyn [address] [phone] Your Primary Care Physician (PCP) Primary Care Physician Office Phone Office Fax [facility] [phone] You are allergic to the following Allergen Reactions Levaquin (Levofloxacin) Other (comments) Joint pain for 1 year Celebrex (Celecoxib) Hives Cephalexin Hives All cephalosporins Codeine Itching Dilaudid (Hydromorphone) Itching Pcn (Penicillins) Hives Anything related. Prilosec (Omeprazole) Hives Recent Documentation Height Weight BMI OB Status Smoking Status 1.524 m 52.6 kg 22.65 kg/m2 Postmenopausal Never Smoker Emergency Contacts Name Discharge Info Relation Home Work Mobile [address] [facility]  CAREGIVER [3] Spouse [3] [phone] Shannon Cota DISCHARGE CAREGIVER [3] Daughter [21]   [phone] Patient Belongings  The following personal items are in your possession at time of discharge: 
  Dental Appliances: None  Visual Aid: Glasses      Home Medications: Sent home (did not bring)   Jewelry: Earrings, Necklace, Watch (2 necklaces) Clothing: Footwear, Pants, Shirt, Socks, Undergarments    Other Valuables: Purse Please provide this summary of care documentation to your next provider. Signatures-by signing, you are acknowledging that this After Visit Summary has been reviewed with you and you have received a copy. Patient Signature:  ____________________________________________________________ Date:  ____________________________________________________________  
  
Porfirio Naas Provider Signature:  ____________________________________________________________ Date:  ____________________________________________________________

## 2018-08-13 ENCOUNTER — APPOINTMENT (OUTPATIENT)
Dept: ULTRASOUND IMAGING | Age: 73
End: 2018-08-13
Attending: EMERGENCY MEDICINE
Payer: MEDICARE

## 2018-08-13 ENCOUNTER — ANESTHESIA EVENT (OUTPATIENT)
Dept: SURGERY | Age: 73
End: 2018-08-13
Payer: MEDICARE

## 2018-08-13 PROBLEM — K80.00 BILIARY CALCULUS WITH ACUTE CHOLECYSTITIS: Status: ACTIVE | Noted: 2018-08-13

## 2018-08-13 PROCEDURE — 74011250637 HC RX REV CODE- 250/637: Performed by: SURGERY

## 2018-08-13 PROCEDURE — 99218 HC RM OBSERVATION: CPT

## 2018-08-13 PROCEDURE — 76705 ECHO EXAM OF ABDOMEN: CPT

## 2018-08-13 PROCEDURE — 74011250636 HC RX REV CODE- 250/636: Performed by: SURGERY

## 2018-08-13 PROCEDURE — 96372 THER/PROPH/DIAG INJ SC/IM: CPT

## 2018-08-13 PROCEDURE — 74011000258 HC RX REV CODE- 258: Performed by: SURGERY

## 2018-08-13 PROCEDURE — 96365 THER/PROPH/DIAG IV INF INIT: CPT

## 2018-08-13 PROCEDURE — 96375 TX/PRO/DX INJ NEW DRUG ADDON: CPT

## 2018-08-13 RX ORDER — SODIUM CHLORIDE 0.9 % (FLUSH) 0.9 %
5-10 SYRINGE (ML) INJECTION EVERY 8 HOURS
Status: DISCONTINUED | OUTPATIENT
Start: 2018-08-13 | End: 2018-08-15 | Stop reason: HOSPADM

## 2018-08-13 RX ORDER — DESVENLAFAXINE SUCCINATE 50 MG/1
50 TABLET, EXTENDED RELEASE ORAL DAILY
Status: DISCONTINUED | OUTPATIENT
Start: 2018-08-13 | End: 2018-08-15 | Stop reason: HOSPADM

## 2018-08-13 RX ORDER — HYDROMORPHONE HYDROCHLORIDE 2 MG/ML
.5-2 INJECTION, SOLUTION INTRAMUSCULAR; INTRAVENOUS; SUBCUTANEOUS
Status: DISCONTINUED | OUTPATIENT
Start: 2018-08-13 | End: 2018-08-15 | Stop reason: HOSPADM

## 2018-08-13 RX ORDER — CLORAZEPATE DIPOTASSIUM 7.5 MG/1
3.75 TABLET ORAL
Status: DISCONTINUED | OUTPATIENT
Start: 2018-08-13 | End: 2018-08-15 | Stop reason: HOSPADM

## 2018-08-13 RX ORDER — ZOLPIDEM TARTRATE 5 MG/1
5 TABLET ORAL
Status: DISCONTINUED | OUTPATIENT
Start: 2018-08-13 | End: 2018-08-15 | Stop reason: HOSPADM

## 2018-08-13 RX ORDER — CIPROFLOXACIN 2 MG/ML
400 INJECTION, SOLUTION INTRAVENOUS EVERY 12 HOURS
Status: DISCONTINUED | OUTPATIENT
Start: 2018-08-13 | End: 2018-08-13

## 2018-08-13 RX ORDER — ONDANSETRON 2 MG/ML
4 INJECTION INTRAMUSCULAR; INTRAVENOUS
Status: DISCONTINUED | OUTPATIENT
Start: 2018-08-13 | End: 2018-08-15 | Stop reason: HOSPADM

## 2018-08-13 RX ORDER — DIPHENHYDRAMINE HYDROCHLORIDE 50 MG/ML
12.5 INJECTION, SOLUTION INTRAMUSCULAR; INTRAVENOUS
Status: DISCONTINUED | OUTPATIENT
Start: 2018-08-13 | End: 2018-08-15 | Stop reason: HOSPADM

## 2018-08-13 RX ORDER — DULOXETIN HYDROCHLORIDE 30 MG/1
30 CAPSULE, DELAYED RELEASE ORAL DAILY
Status: DISCONTINUED | OUTPATIENT
Start: 2018-08-13 | End: 2018-08-15 | Stop reason: HOSPADM

## 2018-08-13 RX ORDER — FLUTICASONE PROPIONATE 50 MCG
2 SPRAY, SUSPENSION (ML) NASAL DAILY
Status: DISCONTINUED | OUTPATIENT
Start: 2018-08-13 | End: 2018-08-15 | Stop reason: HOSPADM

## 2018-08-13 RX ORDER — LISINOPRIL AND HYDROCHLOROTHIAZIDE 10; 12.5 MG/1; MG/1
1 TABLET ORAL DAILY
Status: DISCONTINUED | OUTPATIENT
Start: 2018-08-13 | End: 2018-08-15 | Stop reason: HOSPADM

## 2018-08-13 RX ORDER — ROSUVASTATIN CALCIUM 5 MG/1
10 TABLET, COATED ORAL DAILY
Status: DISCONTINUED | OUTPATIENT
Start: 2018-08-13 | End: 2018-08-15 | Stop reason: HOSPADM

## 2018-08-13 RX ORDER — OXYCODONE HYDROCHLORIDE 5 MG/1
5-15 TABLET ORAL
Status: DISCONTINUED | OUTPATIENT
Start: 2018-08-13 | End: 2018-08-15

## 2018-08-13 RX ORDER — DEXTROSE, SODIUM CHLORIDE, AND POTASSIUM CHLORIDE 5; .45; .15 G/100ML; G/100ML; G/100ML
100 INJECTION INTRAVENOUS CONTINUOUS
Status: DISCONTINUED | OUTPATIENT
Start: 2018-08-13 | End: 2018-08-15 | Stop reason: HOSPADM

## 2018-08-13 RX ORDER — ENOXAPARIN SODIUM 100 MG/ML
40 INJECTION SUBCUTANEOUS EVERY 24 HOURS
Status: DISCONTINUED | OUTPATIENT
Start: 2018-08-13 | End: 2018-08-13

## 2018-08-13 RX ORDER — FAMOTIDINE 20 MG/1
20 TABLET, FILM COATED ORAL DAILY
COMMUNITY

## 2018-08-13 RX ORDER — SODIUM CHLORIDE 0.9 % (FLUSH) 0.9 %
5-10 SYRINGE (ML) INJECTION AS NEEDED
Status: DISCONTINUED | OUTPATIENT
Start: 2018-08-13 | End: 2018-08-15 | Stop reason: HOSPADM

## 2018-08-13 RX ORDER — FAMOTIDINE 20 MG/1
40 TABLET, FILM COATED ORAL DAILY
Status: DISCONTINUED | OUTPATIENT
Start: 2018-08-13 | End: 2018-08-15 | Stop reason: HOSPADM

## 2018-08-13 RX ADMIN — FLUTICASONE PROPIONATE 2 SPRAY: 50 SPRAY, METERED NASAL at 09:00

## 2018-08-13 RX ADMIN — FAMOTIDINE 40 MG: 20 TABLET ORAL at 08:24

## 2018-08-13 RX ADMIN — LISINOPRIL AND HYDROCHLOROTHIAZIDE 1 TABLET: 12.5; 1 TABLET ORAL at 08:23

## 2018-08-13 RX ADMIN — DESVENLAFAXINE SUCCINATE 50 MG: 50 TABLET, EXTENDED RELEASE ORAL at 17:40

## 2018-08-13 RX ADMIN — ONDANSETRON 4 MG: 2 INJECTION, SOLUTION INTRAMUSCULAR; INTRAVENOUS at 05:45

## 2018-08-13 RX ADMIN — ZOLPIDEM TARTRATE 5 MG: 5 TABLET ORAL at 21:58

## 2018-08-13 RX ADMIN — DEXTROSE MONOHYDRATE, SODIUM CHLORIDE, AND POTASSIUM CHLORIDE 100 ML/HR: 50; 4.5; 1.49 INJECTION, SOLUTION INTRAVENOUS at 22:00

## 2018-08-13 RX ADMIN — SODIUM CHLORIDE 1 G: 900 INJECTION, SOLUTION INTRAVENOUS at 04:34

## 2018-08-13 RX ADMIN — DULOXETINE HYDROCHLORIDE 30 MG: 30 CAPSULE, DELAYED RELEASE ORAL at 08:24

## 2018-08-13 RX ADMIN — ENOXAPARIN SODIUM 40 MG: 40 INJECTION, SOLUTION INTRAVENOUS; SUBCUTANEOUS at 08:24

## 2018-08-13 RX ADMIN — OXYCODONE HYDROCHLORIDE 5 MG: 5 TABLET ORAL at 04:37

## 2018-08-13 RX ADMIN — CLORAZEPATE DIPOTASSIUM 3.75 MG: 7.5 TABLET ORAL at 03:35

## 2018-08-13 RX ADMIN — Medication 10 ML: at 02:43

## 2018-08-13 RX ADMIN — DEXTROSE MONOHYDRATE, SODIUM CHLORIDE, AND POTASSIUM CHLORIDE 100 ML/HR: 50; 4.5; 1.49 INJECTION, SOLUTION INTRAVENOUS at 02:42

## 2018-08-13 RX ADMIN — ROSUVASTATIN CALCIUM 10 MG: 5 TABLET, FILM COATED ORAL at 08:24

## 2018-08-13 NOTE — ED NOTES
TRANSFER - OUT REPORT:    Verbal report given to Maria A Shaw (name) on Elyse Mitchell  being transferred to 342 (unit) for routine progression of care       Report consisted of patients Situation, Background, Assessment and   Recommendations(SBAR). Information from the following report(s) SBAR, ED Summary, [facility] and Recent Results was reviewed with the receiving nurse. Lines:   Peripheral IV 08/12/18 Left Antecubital (Active)   Site Assessment Clean, dry, & intact 8/12/2018 10:00 PM   Phlebitis Assessment 0 8/12/2018 10:00 PM   Infiltration Assessment 0 8/12/2018 10:00 PM        Opportunity for questions and clarification was provided.       Patient transported with:   transport

## 2018-08-13 NOTE — PROGRESS NOTES
To room via wheelchair, from [address] with spouse at side. Oriented to bed controls, nurse call light with pt understanding. Gown given, asst with applying.

## 2018-08-13 NOTE — ED PROVIDER NOTES
HPI Comments: 76-year-old lady presents with concerns about epigastric and right upper quadrant pain that she is worried is related to her gallbladder. She has a history of gallstones and has been told that she likely has biliary problems and will probably need surgery. She said that over the past several days on the advice of her primary care doctor she has been trying to eat some fattier foods to see if it triggers any new symptoms. She says that starting today he began to have a lot of epigastric pain that she rates as a 9 out of 10. She states she has had some mild nausea with no vomiting or diarrhea and no blood in her bowels. She has Levsin for this pain and she says she took 2 today which has only helped a little bit. She notes that by arrival to the emergency department she is feeling a little bit better but her pain is not gone. Elements of this note were created using speech recognition software. As such, errors of speech recognition may be present. Patient is a 75 y.o. female presenting with abdominal pain. The history is provided by the patient. Abdominal Pain    Associated symptoms include nausea. Pertinent negatives include no fever, no diarrhea, no vomiting, no dysuria, no hematuria, no headaches, no arthralgias, no myalgias and no chest pain.         Past Medical History:   Diagnosis Date    Anemia     resolved    Angioleiomyoma     Arthritis     Bronchiectasis (HCC)     Bronchiectasis (HCC)     Cancer (HCC)     skin, BCC    Chronic kidney disease     mild    Crohn's disease ([facility] [address]     s/p 2 surgeries    Depression     Dry eye     Fibromyalgia     Hypercholesterolemia     , HDL 55,     Hypertension     Multiple renal cysts     JASON (obstructive sleep apnea)     on cpap    Renal cysts, acquired, bilateral        Past Surgical History:   Procedure Laterality Date    HX  SECTION      2    HX COLECTOMY      Subtotal         Family History:   Problem Relation Age of Onset    Heart Disease Father     Hypertension Father     Arthritis-osteo Father     Alzheimer Mother     Cancer Maternal Grandmother      salivary glands    Bleeding Prob Sister      aneurysm    Breast Cancer Sister        Social History     Social History    Marital status:      Spouse name: N/A    Number of children: N/A    Years of education: N/A     Occupational History    retired      s/p teacher     Social History Main Topics    Smoking status: Never Smoker    Smokeless tobacco: Never Used    Alcohol use No    Drug use: No    Sexual activity: Yes     Partners: Male     Other Topics Concern    Seat Belt Yes    Self-Exams No     Social History Narrative    Denies physical or sexual abuse         ALLERGIES: Celebrex [celecoxib]; Cephalexin; Codeine; Pcn [penicillins]; and Prilosec [omeprazole]    Review of Systems   Constitutional: Negative for chills, diaphoresis and fever. HENT: Negative for congestion, rhinorrhea and sore throat. Eyes: Negative for redness and visual disturbance. Respiratory: Negative for cough, chest tightness, shortness of breath and wheezing. Cardiovascular: Negative for chest pain and palpitations. Gastrointestinal: Positive for abdominal pain and nausea. Negative for blood in stool, diarrhea and vomiting. Endocrine: Negative for polydipsia and polyuria. Genitourinary: Negative for dysuria and hematuria. Musculoskeletal: Negative for arthralgias, myalgias and neck stiffness. Skin: Negative for rash. Allergic/Immunologic: Negative for environmental allergies and food allergies. Neurological: Negative for dizziness, weakness and headaches. Hematological: Negative for adenopathy. Does not bruise/bleed easily. Psychiatric/Behavioral: Negative for confusion and sleep disturbance. The patient is not nervous/anxious.         Vitals:    08/12/18 2154   BP: 158/73   Pulse: 81   Resp: 18   Temp: 97.9 °F (36.6 °C)   SpO2: 93% Weight: 52.6 kg (116 lb)   Height: 5' (1.524 m)            Physical Exam   Constitutional: She is oriented to person, place, and time. She appears well-developed and well-nourished. HENT:   Head: Normocephalic and atraumatic. Eyes: Conjunctivae and EOM are normal. Pupils are equal, round, and reactive to light. Neck: Normal range of motion. Cardiovascular: Normal rate and regular rhythm. Pulmonary/Chest: Effort normal and breath sounds normal. No respiratory distress. She has no wheezes. She has no rales. She exhibits no tenderness. Abdominal: Soft. Bowel sounds are normal. She exhibits no distension. There is no tenderness. There is no rebound and no guarding. Musculoskeletal: Normal range of motion. She exhibits no edema or tenderness. Lymphadenopathy:     She has no cervical adenopathy. Neurological: She is alert and oriented to person, place, and time. Skin: Skin is warm and dry. Psychiatric: She has a normal mood and affect. Nursing note and vitals reviewed. MDM  Number of Diagnoses or Management Options  Diagnosis management comments: I will check an ultrasound given the fact that her white blood cell count is elevated. Ultrasound shows evidence for acute cholecystitis. I will discuss the case with surgery. 1:05 AM  I discussed the case with Dr. Jayne Hall who kindly agreed to admit the patient.         ED Course       Procedures

## 2018-08-13 NOTE — PROGRESS NOTES
Resting quietly, resp even, unlab. Eyes closed with relaxed facial expression during bedside report given to Scooter Forbes RN. Awoke with no c/o. No distress noted.

## 2018-08-13 NOTE — PROGRESS NOTES
Tranxene 3.375 cap given PO for anxiety. Agrees to call for asst to be out of bed if steadiness questionable, rationale understood.

## 2018-08-13 NOTE — PROGRESS NOTES
Zofran 4 mg given IVP slowly for c/o nausea. Head of bed elevated, no emesis. Cold, wet cloth to face. Reports pain improved, rating \"7\".

## 2018-08-13 NOTE — PROGRESS NOTES
08/13/18 0255   Dual Skin Pressure Injury Assessment   Dual Skin Pressure Injury Assessment WDL   Second Care Provider (Based on [address] [place] [address]) Houston Rodriguez RN   Skin Integumentary   Skin Integumentary (WDL) WDL

## 2018-08-13 NOTE — CONSULTS
[address]  [address], [address] [facility], [address]  Onel Garcia        History and Physical/Surgical Consult   Diana Loredo    Admit date: 2018    MRN: [id number]     : 1945     Age: 75 y.o.          2018 12:00 PM    Subjective/HPI:   This patient is a 75 y.o. seen and evaluated at the request of hospitalist.  This is a 58-year-old female with history of Crohn's disease and subtotal colectomy complaining of right upper quadrant abdominal pain. Patient states that her pain is located in the epigastric region and upper quadrant with radiation to her back. She states the pain began 1 month ago and a gallbladder ultrasound at that time showed gallstones. Over the next 2 weeks. The patient maintained a low-fat diet and remained asymptomatic with no pain. She was seen by her primary care doctor who advised her to resume her normal diet. After resuming fatty and greasy foods she began having the right upper quadrant abdominal pain once again. 2 days ago the pain became severe rated 10 out of 10 with radiation to her back. The pain is associated with headache and nausea. She was seen in the emergency department yesterday and again a gallbladder ultrasound revealed revealed gallstones gallbladder sludge and a thickened gallbladder wall suggesting acute cholecystitis. She has slightly elevated white blood cell count as well. Today she is being evaluated for acute cholecystitis. .     Review of Systems  A comprehensive review of systems was negative except for that written in the HPI.   Past Medical History:   Diagnosis Date    Anemia     resolved    Angioleiomyoma     Arthritis     Bronchiectasis (HCC)     Bronchiectasis (HCC)     Cancer (HCC)     skin, BCC    Chronic kidney disease     mild    Crohn's disease ([facility] [address]     s/p 2 surgeries    Depression     Dry eye     Fibromyalgia     Hypercholesterolemia     , HDL 55,     Hypertension  Multiple renal cysts     JASON (obstructive sleep apnea)     on cpap    Renal cysts, acquired, bilateral       Past Surgical History:   Procedure Laterality Date    HX  SECTION      2    HX COLECTOMY      Subtotal      Allergies   Allergen Reactions    Levaquin [Levofloxacin] Other (comments)     Joint pain for 1 year    Celebrex [Celecoxib] Hives    Cephalexin Hives     All cephalosporins    Codeine Itching    Dilaudid [Hydromorphone] Itching    Pcn [Penicillins] Hives     Anything related.  Prilosec [Omeprazole] Hives      Social History   Substance Use Topics    Smoking status: Never Smoker    Smokeless tobacco: Never Used    Alcohol use No      Social History     Social History Narrative    Denies physical or sexual abuse     Family History   Problem Relation Age of Onset    Heart Disease Father     Hypertension Father     Arthritis-osteo Father     Alzheimer Mother     Cancer Maternal Grandmother      salivary glands    Bleeding Prob Sister      aneurysm    Breast Cancer Sister       Prior to Admission Medications   Prescriptions Last Dose Informant Patient Reported? Taking? DULoxetine (CYMBALTA) 30 mg capsule   No Yes   Sig: Take 1 Cap by mouth daily. HYDROcodone-acetaminophen (NORCO) 7.5-325 mg per tablet   No Yes   Sig: Take 1 Tab by mouth every six (6) hours as needed for Pain. Max Daily Amount: 4 Tabs. acetaminophen (TYLENOL) 500 mg tablet   Yes Yes   Sig: Take 1,000 mg by mouth daily as needed for Pain. cholecalciferol, VITAMIN D3, (VITAMIN D3) 5,000 unit tab tablet   Yes Yes   Sig: Take  by mouth daily. clorazepate (TRANXENE) 3.75 mg tablet   No Yes   Sig: Take 1 Tab by mouth two (2) times daily as needed. Max Daily Amount: 7.5 mg. Patient taking differently: Take 3.75 mg by mouth three (3) times daily. cyanocobalamin (VITAMIN B12) 500 mcg tablet   Yes Yes   Sig: Take 500 mcg by mouth daily.    desvenlafaxine succinate (PRISTIQ) 50 mg ER tablet   No Yes Sig: Take 1 Tab by mouth daily. famotidine (PEPCID) 20 mg tablet   Yes Yes   Sig: Take 20 mg by mouth daily. fluticasone (FLONASE) 50 mcg/actuation nasal spray   No Yes   Si Sprays by Both Nostrils route daily. hyoscyamine (LEVSIN) 0.125 mg tablet   No Yes   Sig: Take 1 Tab by mouth every four (4) hours as needed for Cramping. lisinopril-hydroCHLOROthiazide (PRINZIDE, ZESTORETIC) 10-12.5 mg per tablet   No Yes   Sig: Take 1 Tab by mouth daily. ondansetron hcl (ZOFRAN) 8 mg tablet   No Yes   Sig: Take 1 Tab by mouth every eight (8) hours as needed for Nausea. rosuvastatin (CRESTOR) 10 mg tablet   No Yes   Sig: Take 1 Tab by mouth nightly. zolpidem (AMBIEN) 5 mg tablet   No Yes   Sig: Take 1 Tab by mouth nightly. Max Daily Amount: 5 mg.       Facility-Administered Medications: None     Current Facility-Administered Medications   Medication Dose Route Frequency    desvenlafaxine succinate (PRISTIQ) ER tablet 50 mg (Patient Supplied)  50 mg Oral DAILY    DULoxetine (CYMBALTA) capsule 30 mg  30 mg Oral DAILY    famotidine (PEPCID) tablet 40 mg  40 mg Oral DAILY    fluticasone (FLONASE) 50 mcg/actuation nasal spray 2 Spray  2 Spray Both Nostrils DAILY    zolpidem (AMBIEN) tablet 5 mg  5 mg Oral QHS    lisinopril-hydroCHLOROthiazide (PRINZIDE, ZESTORETIC) 10-12.5 mg per tablet 1 Tab  1 Tab Oral DAILY    dextrose 5% - 0.45% NaCl with KCl 20 mEq/L infusion  100 mL/hr IntraVENous CONTINUOUS    sodium chloride (NS) flush 5-10 mL  5-10 mL IntraVENous Q8H    sodium chloride (NS) flush 5-10 mL  5-10 mL IntraVENous PRN    ondansetron (ZOFRAN) injection 4 mg  4 mg IntraVENous Q4H PRN    diphenhydrAMINE (BENADRYL) injection 12.5 mg  12.5 mg IntraVENous Q4H PRN    enoxaparin (LOVENOX) injection 40 mg  40 mg SubCUTAneous Q24H    oxyCODONE IR (ROXICODONE) tablet 5-15 mg  5-15 mg Oral Q4H PRN    HYDROmorphone (PF) (DILAUDID) injection 0.5-2 mg  0.5-2 mg IntraVENous Q3H PRN    rosuvastatin (CRESTOR) tablet 10 mg  10 mg Oral DAILY    clorazepate (TRANXENE) tablet 3.75 mg  3.75 mg Oral BID PRN    ertapenem (INVANZ) 1 g in 0.9% sodium chloride (MBP/ADV) 50 mL  1 g IntraVENous Q24H     Objective:     Vitals:    08/13/18 0122 08/13/18 0140 08/13/18 0402 08/13/18 0747   BP: 168/70 147/77 138/82 119/73   Pulse: 80 74 83 83   Resp: 16 16 16 16   Temp: 98.8 °F (37.1 °C) 97.9 °F (36.6 °C) 98.1 °F (36.7 °C) 98.1 °F (36.7 °C)   SpO2: 99% 95% 98% 93%   Weight:       Height:         08/13 0701 - 08/13 1900  In: -   Out: 300 [Urine:300]  08/11 1901 - 08/13 0700  In: -   Out: 180 [Urine:180]  Physical Exam:   Gen- the patient is well developed and in no acute distress  HEENT- PERRL, EOMI, no scleral icterus       nose without alar flaring or epistaxis                  oral muscosa moist without cyanosis  Neck- no JVD or retractions  Lungs- resp even/unlab   Heart- RRR   Abd- Soft. Mild TTP in the RUQ. No Hays's. No rebound  Ext- warm without cyanosis. There is no lower leg edema. Skin- no jaundice or rashes  Neuro- alert and oriented x 3. No gross sensorimotor deficits are present. Data Review   Recent Labs      08/12/18   2207   WBC  15.7*   HGB  13.1   HCT  40.5   PLT  232     Recent Labs      08/12/18   2207   NA  138   K  3.4*   CL  100   CO2  28   GLU  118*   BUN  22   CREA  1.16*       Assessment:     Hospital Problems  Date Reviewed: 8/21/2017          Codes Class Noted POA    Biliary calculus with acute cholecystitis ICD-10-CM: K80.00  ICD-9-CM: 574.00  8/13/2018 Unknown            Plan:   Plan for laparoscopic cholecystectomy with possible cholangiogram tomorrow. N.p.o. at midnight  Consent for laparoscopic cholecystectomy and possible cholangiogram  Continue antibiotics    Today I discussed gallbladder disorders with the patient and her . The patient does have a history of Crohn's disease and subtotal colectomy. I have recommended a laparoscopic cholecystectomy.   I discussed the details of the procedure with him today and she will be scheduled tomorrow afternoon at the next available date and time.   --    Pretty Evans, DO

## 2018-08-13 NOTE — PROGRESS NOTES
TRANSFER - IN REPORT:    Verbal report received from Yamel Sepulveda RN on Tani Costa  being received from [address] [facility] [address] for routine progression of care      Report consisted of patients Situation, Background, Assessment and   Recommendations(SBAR). Information from the following report(s) SBAR and ED Summary was reviewed with the receiving nurse. Opportunity for questions and clarification was provided. Assessment to be completed upon patients arrival to unit and care to be assumed.

## 2018-08-13 NOTE — PROGRESS NOTES
Pt  was told to bring in pt home med that is scheduled here, he brought in pill box with no prescription label/bottle. Instructed pt and  that pharmacy would need prescription bottle in order to be able to administer pt Pristiq. Pt and  understand and the  states he will go home and get it after they speak with the doctor.

## 2018-08-13 NOTE — ED TRIAGE NOTES
Patient presents with complaints of \"having a gallbladder attack\". Patient complains of upper abdominal pain that began several hours ago, which radiates to middle back. Patient denies chest pain, SOB, N/V/D. Patient denies urinary symptoms.

## 2018-08-13 NOTE — PROGRESS NOTES
Shift assessment complete. Pt resting quietly in bed. No signs of acute distress. Alert and oriented x4. Resp even and unlabored. Pt appears a little anxious at this time Call light within reach. Pt instructed to call for assistance.

## 2018-08-13 NOTE — PROGRESS NOTES
Continues to rest quietly, awake. Assessment completed with no c/o. Waiting for newly ordered antibiotic available from [address] Reports steady standing and ambulating. No distress.

## 2018-08-13 NOTE — PROGRESS NOTES
Medicare Outpatient Observation Notice provided to the patient. Oral explanation was provided and all questions answered. Signed document placed in the medical record under media tab. Copy to patient.     DCR

## 2018-08-14 ENCOUNTER — ANESTHESIA (OUTPATIENT)
Dept: SURGERY | Age: 73
End: 2018-08-14
Payer: MEDICARE

## 2018-08-14 PROCEDURE — 77030035048 HC TRCR ENDOSC OPTCL COVD -B: Performed by: SURGERY

## 2018-08-14 PROCEDURE — 74011000250 HC RX REV CODE- 250

## 2018-08-14 PROCEDURE — 74011250636 HC RX REV CODE- 250/636: Performed by: ANESTHESIOLOGY

## 2018-08-14 PROCEDURE — 74011250636 HC RX REV CODE- 250/636

## 2018-08-14 PROCEDURE — 99218 HC RM OBSERVATION: CPT

## 2018-08-14 PROCEDURE — 77030008756 HC TU IRR SUC STRY -B: Performed by: SURGERY

## 2018-08-14 PROCEDURE — 77030032490 HC SLV COMPR SCD KNE COVD -B

## 2018-08-14 PROCEDURE — 74011250636 HC RX REV CODE- 250/636: Performed by: SURGERY

## 2018-08-14 PROCEDURE — 76210000006 HC OR PH I REC 0.5 TO 1 HR: Performed by: SURGERY

## 2018-08-14 PROCEDURE — 76060000033 HC ANESTHESIA 1 TO 1.5 HR: Performed by: SURGERY

## 2018-08-14 PROCEDURE — 96366 THER/PROPH/DIAG IV INF ADDON: CPT

## 2018-08-14 PROCEDURE — 77030020782 HC GWN BAIR PAWS FLX 3M -B: Performed by: ANESTHESIOLOGY

## 2018-08-14 PROCEDURE — 77030035220 HC TRCR ENDOSC BLNTPRT ANCHR COVD -B: Performed by: SURGERY

## 2018-08-14 PROCEDURE — 77030019908 HC STETH ESOPH SIMS -A: Performed by: ANESTHESIOLOGY

## 2018-08-14 PROCEDURE — 74011000258 HC RX REV CODE- 258: Performed by: SURGERY

## 2018-08-14 PROCEDURE — 77030018836 HC SOL IRR NACL ICUM -A: Performed by: SURGERY

## 2018-08-14 PROCEDURE — 88304 TISSUE EXAM BY PATHOLOGIST: CPT

## 2018-08-14 PROCEDURE — 77030008703 HC TU ET UNCUF COVD -A: Performed by: ANESTHESIOLOGY

## 2018-08-14 PROCEDURE — 77030012022 HC APPL CLP ENDOSC COVD -C: Performed by: SURGERY

## 2018-08-14 PROCEDURE — 77030004818 HC CATH CHOLGM TELE -B: Performed by: SURGERY

## 2018-08-14 PROCEDURE — 76010000149 HC OR TIME 1 TO 1.5 HR: Performed by: SURGERY

## 2018-08-14 PROCEDURE — 94760 N-INVAS EAR/PLS OXIMETRY 1: CPT

## 2018-08-14 PROCEDURE — 77030031139 HC SUT VCRL2 J&J -A: Performed by: SURGERY

## 2018-08-14 PROCEDURE — 77030008477 HC STYL SATN SLP COVD -A: Performed by: ANESTHESIOLOGY

## 2018-08-14 PROCEDURE — 77030035051: Performed by: SURGERY

## 2018-08-14 PROCEDURE — 77030008522 HC TBNG INSUF LAPRO STRY -B: Performed by: SURGERY

## 2018-08-14 PROCEDURE — 74011000250 HC RX REV CODE- 250: Performed by: SURGERY

## 2018-08-14 PROCEDURE — 77030016151 HC PROTCTR LNS DFOG COVD -B: Performed by: SURGERY

## 2018-08-14 PROCEDURE — 77010033678 HC OXYGEN DAILY

## 2018-08-14 PROCEDURE — 74011250637 HC RX REV CODE- 250/637: Performed by: SURGERY

## 2018-08-14 PROCEDURE — 77030009852 HC PCH RTVR ENDOSC COVD -B: Performed by: SURGERY

## 2018-08-14 PROCEDURE — 77030020255 HC SOL INJ LR 1000ML BG

## 2018-08-14 RX ORDER — LIDOCAINE HYDROCHLORIDE 10 MG/ML
0.1 INJECTION INFILTRATION; PERINEURAL AS NEEDED
Status: DISCONTINUED | OUTPATIENT
Start: 2018-08-14 | End: 2018-08-15 | Stop reason: HOSPADM

## 2018-08-14 RX ORDER — FENTANYL CITRATE 50 UG/ML
INJECTION, SOLUTION INTRAMUSCULAR; INTRAVENOUS AS NEEDED
Status: DISCONTINUED | OUTPATIENT
Start: 2018-08-14 | End: 2018-08-14 | Stop reason: HOSPADM

## 2018-08-14 RX ORDER — OXYCODONE HYDROCHLORIDE 5 MG/1
10 TABLET ORAL
Status: DISCONTINUED | OUTPATIENT
Start: 2018-08-14 | End: 2018-08-14 | Stop reason: HOSPADM

## 2018-08-14 RX ORDER — MIDAZOLAM HYDROCHLORIDE 1 MG/ML
2 INJECTION, SOLUTION INTRAMUSCULAR; INTRAVENOUS ONCE
Status: COMPLETED | OUTPATIENT
Start: 2018-08-14 | End: 2018-08-14

## 2018-08-14 RX ORDER — ONDANSETRON 2 MG/ML
INJECTION INTRAMUSCULAR; INTRAVENOUS AS NEEDED
Status: DISCONTINUED | OUTPATIENT
Start: 2018-08-14 | End: 2018-08-14 | Stop reason: HOSPADM

## 2018-08-14 RX ORDER — NALOXONE HYDROCHLORIDE 0.4 MG/ML
0.1 INJECTION, SOLUTION INTRAMUSCULAR; INTRAVENOUS; SUBCUTANEOUS AS NEEDED
Status: DISCONTINUED | OUTPATIENT
Start: 2018-08-14 | End: 2018-08-14 | Stop reason: HOSPADM

## 2018-08-14 RX ORDER — PROPOFOL 10 MG/ML
INJECTION, EMULSION INTRAVENOUS AS NEEDED
Status: DISCONTINUED | OUTPATIENT
Start: 2018-08-14 | End: 2018-08-14 | Stop reason: HOSPADM

## 2018-08-14 RX ORDER — ALBUTEROL SULFATE 0.83 MG/ML
2.5 SOLUTION RESPIRATORY (INHALATION) AS NEEDED
Status: DISCONTINUED | OUTPATIENT
Start: 2018-08-14 | End: 2018-08-14 | Stop reason: HOSPADM

## 2018-08-14 RX ORDER — ROCURONIUM BROMIDE 10 MG/ML
INJECTION, SOLUTION INTRAVENOUS AS NEEDED
Status: DISCONTINUED | OUTPATIENT
Start: 2018-08-14 | End: 2018-08-14 | Stop reason: HOSPADM

## 2018-08-14 RX ORDER — OXYCODONE HYDROCHLORIDE 5 MG/1
5 TABLET ORAL
Status: DISCONTINUED | OUTPATIENT
Start: 2018-08-14 | End: 2018-08-14 | Stop reason: HOSPADM

## 2018-08-14 RX ORDER — MIDAZOLAM HYDROCHLORIDE 1 MG/ML
2 INJECTION, SOLUTION INTRAMUSCULAR; INTRAVENOUS
Status: DISCONTINUED | OUTPATIENT
Start: 2018-08-14 | End: 2018-08-15 | Stop reason: HOSPADM

## 2018-08-14 RX ORDER — DIPHENHYDRAMINE HYDROCHLORIDE 50 MG/ML
12.5 INJECTION, SOLUTION INTRAMUSCULAR; INTRAVENOUS
Status: DISCONTINUED | OUTPATIENT
Start: 2018-08-14 | End: 2018-08-14 | Stop reason: HOSPADM

## 2018-08-14 RX ORDER — ACETAMINOPHEN 10 MG/ML
INJECTION, SOLUTION INTRAVENOUS AS NEEDED
Status: DISCONTINUED | OUTPATIENT
Start: 2018-08-14 | End: 2018-08-14 | Stop reason: HOSPADM

## 2018-08-14 RX ORDER — DEXAMETHASONE SODIUM PHOSPHATE 4 MG/ML
INJECTION, SOLUTION INTRA-ARTICULAR; INTRALESIONAL; INTRAMUSCULAR; INTRAVENOUS; SOFT TISSUE AS NEEDED
Status: DISCONTINUED | OUTPATIENT
Start: 2018-08-14 | End: 2018-08-14 | Stop reason: HOSPADM

## 2018-08-14 RX ORDER — SODIUM CHLORIDE, SODIUM LACTATE, POTASSIUM CHLORIDE, CALCIUM CHLORIDE 600; 310; 30; 20 MG/100ML; MG/100ML; MG/100ML; MG/100ML
100 INJECTION, SOLUTION INTRAVENOUS CONTINUOUS
Status: DISCONTINUED | OUTPATIENT
Start: 2018-08-14 | End: 2018-08-15 | Stop reason: HOSPADM

## 2018-08-14 RX ORDER — NEOSTIGMINE METHYLSULFATE 1 MG/ML
INJECTION INTRAVENOUS AS NEEDED
Status: DISCONTINUED | OUTPATIENT
Start: 2018-08-14 | End: 2018-08-14 | Stop reason: HOSPADM

## 2018-08-14 RX ORDER — GLYCOPYRROLATE 0.2 MG/ML
INJECTION INTRAMUSCULAR; INTRAVENOUS AS NEEDED
Status: DISCONTINUED | OUTPATIENT
Start: 2018-08-14 | End: 2018-08-14 | Stop reason: HOSPADM

## 2018-08-14 RX ORDER — LIDOCAINE HYDROCHLORIDE 20 MG/ML
INJECTION, SOLUTION EPIDURAL; INFILTRATION; INTRACAUDAL; PERINEURAL AS NEEDED
Status: DISCONTINUED | OUTPATIENT
Start: 2018-08-14 | End: 2018-08-14 | Stop reason: HOSPADM

## 2018-08-14 RX ORDER — SODIUM CHLORIDE, SODIUM LACTATE, POTASSIUM CHLORIDE, CALCIUM CHLORIDE 600; 310; 30; 20 MG/100ML; MG/100ML; MG/100ML; MG/100ML
100 INJECTION, SOLUTION INTRAVENOUS CONTINUOUS
Status: DISCONTINUED | OUTPATIENT
Start: 2018-08-14 | End: 2018-08-14 | Stop reason: HOSPADM

## 2018-08-14 RX ORDER — ACETAMINOPHEN 10 MG/ML
1000 INJECTION, SOLUTION INTRAVENOUS ONCE
Status: COMPLETED | OUTPATIENT
Start: 2018-08-14 | End: 2018-08-14

## 2018-08-14 RX ORDER — BUPIVACAINE HYDROCHLORIDE AND EPINEPHRINE 2.5; 5 MG/ML; UG/ML
INJECTION, SOLUTION EPIDURAL; INFILTRATION; INTRACAUDAL; PERINEURAL AS NEEDED
Status: DISCONTINUED | OUTPATIENT
Start: 2018-08-14 | End: 2018-08-14 | Stop reason: HOSPADM

## 2018-08-14 RX ORDER — FENTANYL CITRATE 50 UG/ML
100 INJECTION, SOLUTION INTRAMUSCULAR; INTRAVENOUS ONCE
Status: ACTIVE | OUTPATIENT
Start: 2018-08-14 | End: 2018-08-15

## 2018-08-14 RX ORDER — KETOROLAC TROMETHAMINE 30 MG/ML
INJECTION, SOLUTION INTRAMUSCULAR; INTRAVENOUS AS NEEDED
Status: DISCONTINUED | OUTPATIENT
Start: 2018-08-14 | End: 2018-08-14 | Stop reason: HOSPADM

## 2018-08-14 RX ORDER — ONDANSETRON 2 MG/ML
4 INJECTION INTRAMUSCULAR; INTRAVENOUS ONCE
Status: DISCONTINUED | OUTPATIENT
Start: 2018-08-14 | End: 2018-08-14 | Stop reason: HOSPADM

## 2018-08-14 RX ADMIN — ACETAMINOPHEN 1000 MG: 10 INJECTION, SOLUTION INTRAVENOUS at 18:07

## 2018-08-14 RX ADMIN — MIDAZOLAM HYDROCHLORIDE 2 MG: 1 INJECTION, SOLUTION INTRAMUSCULAR; INTRAVENOUS at 15:15

## 2018-08-14 RX ADMIN — FENTANYL CITRATE 100 MCG: 50 INJECTION, SOLUTION INTRAMUSCULAR; INTRAVENOUS at 15:35

## 2018-08-14 RX ADMIN — ROCURONIUM BROMIDE 30 MG: 10 INJECTION, SOLUTION INTRAVENOUS at 15:43

## 2018-08-14 RX ADMIN — KETOROLAC TROMETHAMINE 15 MG: 30 INJECTION, SOLUTION INTRAMUSCULAR; INTRAVENOUS at 16:29

## 2018-08-14 RX ADMIN — HYDROMORPHONE HYDROCHLORIDE 2 MG: 2 INJECTION, SOLUTION INTRAMUSCULAR; INTRAVENOUS; SUBCUTANEOUS at 21:18

## 2018-08-14 RX ADMIN — PROPOFOL 100 MG: 10 INJECTION, EMULSION INTRAVENOUS at 15:43

## 2018-08-14 RX ADMIN — DEXAMETHASONE SODIUM PHOSPHATE 4 MG: 4 INJECTION, SOLUTION INTRA-ARTICULAR; INTRALESIONAL; INTRAMUSCULAR; INTRAVENOUS; SOFT TISSUE at 15:43

## 2018-08-14 RX ADMIN — PROPOFOL 10 MG: 10 INJECTION, EMULSION INTRAVENOUS at 16:36

## 2018-08-14 RX ADMIN — LIDOCAINE HYDROCHLORIDE 100 MG: 20 INJECTION, SOLUTION EPIDURAL; INFILTRATION; INTRACAUDAL; PERINEURAL at 15:43

## 2018-08-14 RX ADMIN — ACETAMINOPHEN 1000 MG: 10 INJECTION, SOLUTION INTRAVENOUS at 16:46

## 2018-08-14 RX ADMIN — SODIUM CHLORIDE 1 G: 900 INJECTION, SOLUTION INTRAVENOUS at 02:57

## 2018-08-14 RX ADMIN — DEXTROSE MONOHYDRATE, SODIUM CHLORIDE, AND POTASSIUM CHLORIDE 100 ML/HR: 50; 4.5; 1.49 INJECTION, SOLUTION INTRAVENOUS at 18:07

## 2018-08-14 RX ADMIN — FENTANYL CITRATE 25 MCG: 50 INJECTION, SOLUTION INTRAMUSCULAR; INTRAVENOUS at 16:16

## 2018-08-14 RX ADMIN — FAMOTIDINE 40 MG: 20 TABLET ORAL at 08:23

## 2018-08-14 RX ADMIN — FLUTICASONE PROPIONATE 2 SPRAY: 50 SPRAY, METERED NASAL at 08:23

## 2018-08-14 RX ADMIN — ROSUVASTATIN CALCIUM 10 MG: 5 TABLET, FILM COATED ORAL at 08:23

## 2018-08-14 RX ADMIN — DULOXETINE HYDROCHLORIDE 30 MG: 30 CAPSULE, DELAYED RELEASE ORAL at 08:23

## 2018-08-14 RX ADMIN — SODIUM CHLORIDE, SODIUM LACTATE, POTASSIUM CHLORIDE, AND CALCIUM CHLORIDE: 600; 310; 30; 20 INJECTION, SOLUTION INTRAVENOUS at 15:24

## 2018-08-14 RX ADMIN — GLYCOPYRROLATE 0.4 MG: 0.2 INJECTION INTRAMUSCULAR; INTRAVENOUS at 16:30

## 2018-08-14 RX ADMIN — ONDANSETRON 4 MG: 2 INJECTION INTRAMUSCULAR; INTRAVENOUS at 15:56

## 2018-08-14 RX ADMIN — NEOSTIGMINE METHYLSULFATE 3 MG: 1 INJECTION INTRAVENOUS at 16:30

## 2018-08-14 NOTE — PROGRESS NOTES
Shift assessment complete. Pt resting quietly in bed. Alert and oriented x4. Resp even and unlabored. Pt a little nervous about surgery today. No complaints at this time. Call light within reach. Pt instructed to call for assistance.

## 2018-08-14 NOTE — ANESTHESIA PREPROCEDURE EVALUATION
Anesthetic History   No history of anesthetic complications            Review of Systems / Medical History  Patient summary reviewed and pertinent labs reviewed    Pulmonary        Sleep apnea: CPAP        Comments: Bronchiectasis - well controlled, no treatment, no Pulmonary f/u in 3 yrs.    Neuro/Psych         Psychiatric history     Cardiovascular    Hypertension: well controlled          Hyperlipidemia    Exercise tolerance: >4 METS     GI/Hepatic/Renal     GERD: well controlled          Comments: Crohn's dz - s/p partial colectomy Endo/Other        Arthritis     Other Findings              Physical Exam    Airway  Mallampati: II  TM Distance: 4 - 6 cm  Neck ROM: normal range of motion   Mouth opening: Normal     Cardiovascular    Rhythm: regular  Rate: normal         Dental  No notable dental hx       Pulmonary  Breath sounds clear to auscultation               Abdominal         Other Findings            Anesthetic Plan    ASA: 2  Anesthesia type: general            Anesthetic plan and risks discussed with: Patient

## 2018-08-14 NOTE — H&P (VIEW-ONLY)
[address]  [address] [facility], [address] [place] [address], [address] [facility]       History and Physical/Surgical Consult   Chandu Anderson    Admit date: 2018    MRN: [id number]     : 1945     Age: 75 y.o.          2018 12:00 PM    Subjective/HPI:   This patient is a 75 y.o. seen and evaluated at the request of hospitalist.  This is a 60-year-old female with history of Crohn's disease and subtotal colectomy complaining of right upper quadrant abdominal pain. Patient states that her pain is located in the epigastric region and upper quadrant with radiation to her back. She states the pain began 1 month ago and a gallbladder ultrasound at that time showed gallstones. Over the next 2 weeks. The patient maintained a low-fat diet and remained asymptomatic with no pain. She was seen by her primary care doctor who advised her to resume her normal diet. After resuming fatty and greasy foods she began having the right upper quadrant abdominal pain once again. 2 days ago the pain became severe rated 10 out of 10 with radiation to her back. The pain is associated with headache and nausea. She was seen in the emergency department yesterday and again a gallbladder ultrasound revealed revealed gallstones gallbladder sludge and a thickened gallbladder wall suggesting acute cholecystitis. She has slightly elevated white blood cell count as well. Today she is being evaluated for acute cholecystitis. .     Review of Systems  A comprehensive review of systems was negative except for that written in the HPI.   Past Medical History:   Diagnosis Date    Anemia     resolved    Angioleiomyoma     Arthritis     Bronchiectasis (HCC)     Bronchiectasis (HCC)     Cancer (HCC)     skin, BCC    Chronic kidney disease     mild    Crohn's disease ([facility] [address]     s/p 2 surgeries    Depression     Dry eye     Fibromyalgia     Hypercholesterolemia     , HDL 55,     Hypertension  Multiple renal cysts     JASON (obstructive sleep apnea)     on cpap    Renal cysts, acquired, bilateral       Past Surgical History:   Procedure Laterality Date    HX  SECTION      2    HX COLECTOMY      Subtotal      Allergies   Allergen Reactions    Levaquin [Levofloxacin] Other (comments)     Joint pain for 1 year    Celebrex [Celecoxib] Hives    Cephalexin Hives     All cephalosporins    Codeine Itching    Dilaudid [Hydromorphone] Itching    Pcn [Penicillins] Hives     Anything related.  Prilosec [Omeprazole] Hives      Social History   Substance Use Topics    Smoking status: Never Smoker    Smokeless tobacco: Never Used    Alcohol use No      Social History     Social History Narrative    Denies physical or sexual abuse     Family History   Problem Relation Age of Onset    Heart Disease Father     Hypertension Father     Arthritis-osteo Father     Alzheimer Mother     Cancer Maternal Grandmother      salivary glands    Bleeding Prob Sister      aneurysm    Breast Cancer Sister       Prior to Admission Medications   Prescriptions Last Dose Informant Patient Reported? Taking? DULoxetine (CYMBALTA) 30 mg capsule   No Yes   Sig: Take 1 Cap by mouth daily. HYDROcodone-acetaminophen (NORCO) 7.5-325 mg per tablet   No Yes   Sig: Take 1 Tab by mouth every six (6) hours as needed for Pain. Max Daily Amount: 4 Tabs. acetaminophen (TYLENOL) 500 mg tablet   Yes Yes   Sig: Take 1,000 mg by mouth daily as needed for Pain. cholecalciferol, VITAMIN D3, (VITAMIN D3) 5,000 unit tab tablet   Yes Yes   Sig: Take  by mouth daily. clorazepate (TRANXENE) 3.75 mg tablet   No Yes   Sig: Take 1 Tab by mouth two (2) times daily as needed. Max Daily Amount: 7.5 mg. Patient taking differently: Take 3.75 mg by mouth three (3) times daily. cyanocobalamin (VITAMIN B12) 500 mcg tablet   Yes Yes   Sig: Take 500 mcg by mouth daily.    desvenlafaxine succinate (PRISTIQ) 50 mg ER tablet   No Yes Sig: Take 1 Tab by mouth daily. famotidine (PEPCID) 20 mg tablet   Yes Yes   Sig: Take 20 mg by mouth daily. fluticasone (FLONASE) 50 mcg/actuation nasal spray   No Yes   Si Sprays by Both Nostrils route daily. hyoscyamine (LEVSIN) 0.125 mg tablet   No Yes   Sig: Take 1 Tab by mouth every four (4) hours as needed for Cramping. lisinopril-hydroCHLOROthiazide (PRINZIDE, ZESTORETIC) 10-12.5 mg per tablet   No Yes   Sig: Take 1 Tab by mouth daily. ondansetron hcl (ZOFRAN) 8 mg tablet   No Yes   Sig: Take 1 Tab by mouth every eight (8) hours as needed for Nausea. rosuvastatin (CRESTOR) 10 mg tablet   No Yes   Sig: Take 1 Tab by mouth nightly. zolpidem (AMBIEN) 5 mg tablet   No Yes   Sig: Take 1 Tab by mouth nightly. Max Daily Amount: 5 mg.       Facility-Administered Medications: None     Current Facility-Administered Medications   Medication Dose Route Frequency    desvenlafaxine succinate (PRISTIQ) ER tablet 50 mg (Patient Supplied)  50 mg Oral DAILY    DULoxetine (CYMBALTA) capsule 30 mg  30 mg Oral DAILY    famotidine (PEPCID) tablet 40 mg  40 mg Oral DAILY    fluticasone (FLONASE) 50 mcg/actuation nasal spray 2 Spray  2 Spray Both Nostrils DAILY    zolpidem (AMBIEN) tablet 5 mg  5 mg Oral QHS    lisinopril-hydroCHLOROthiazide (PRINZIDE, ZESTORETIC) 10-12.5 mg per tablet 1 Tab  1 Tab Oral DAILY    dextrose 5% - 0.45% NaCl with KCl 20 mEq/L infusion  100 mL/hr IntraVENous CONTINUOUS    sodium chloride (NS) flush 5-10 mL  5-10 mL IntraVENous Q8H    sodium chloride (NS) flush 5-10 mL  5-10 mL IntraVENous PRN    ondansetron (ZOFRAN) injection 4 mg  4 mg IntraVENous Q4H PRN    diphenhydrAMINE (BENADRYL) injection 12.5 mg  12.5 mg IntraVENous Q4H PRN    enoxaparin (LOVENOX) injection 40 mg  40 mg SubCUTAneous Q24H    oxyCODONE IR (ROXICODONE) tablet 5-15 mg  5-15 mg Oral Q4H PRN    HYDROmorphone (PF) (DILAUDID) injection 0.5-2 mg  0.5-2 mg IntraVENous Q3H PRN    rosuvastatin (CRESTOR) tablet 10 mg  10 mg Oral DAILY    clorazepate (TRANXENE) tablet 3.75 mg  3.75 mg Oral BID PRN    ertapenem (INVANZ) 1 g in 0.9% sodium chloride (MBP/ADV) 50 mL  1 g IntraVENous Q24H     Objective:     Vitals:    08/13/18 0122 08/13/18 0140 08/13/18 0402 08/13/18 0747   BP: 168/70 147/77 138/82 119/73   Pulse: 80 74 83 83   Resp: 16 16 16 16   Temp: 98.8 °F (37.1 °C) 97.9 °F (36.6 °C) 98.1 °F (36.7 °C) 98.1 °F (36.7 °C)   SpO2: 99% 95% 98% 93%   Weight:       Height:         08/13 0701 - 08/13 1900  In: -   Out: 300 [Urine:300]  08/11 1901 - 08/13 0700  In: -   Out: 180 [Urine:180]  Physical Exam:   Gen- the patient is well developed and in no acute distress  HEENT- PERRL, EOMI, no scleral icterus       nose without alar flaring or epistaxis                  oral muscosa moist without cyanosis  Neck- no JVD or retractions  Lungs- resp even/unlab   Heart- RRR   Abd- Soft. Mild TTP in the RUQ. No Hays's. No rebound  Ext- warm without cyanosis. There is no lower leg edema. Skin- no jaundice or rashes  Neuro- alert and oriented x 3. No gross sensorimotor deficits are present. Data Review   Recent Labs      08/12/18   2207   WBC  15.7*   HGB  13.1   HCT  40.5   PLT  232     Recent Labs      08/12/18   2207   NA  138   K  3.4*   CL  100   CO2  28   GLU  118*   BUN  22   CREA  1.16*       Assessment:     Hospital Problems  Date Reviewed: 8/21/2017          Codes Class Noted POA    Biliary calculus with acute cholecystitis ICD-10-CM: K80.00  ICD-9-CM: 574.00  8/13/2018 Unknown            Plan:   Plan for laparoscopic cholecystectomy with possible cholangiogram tomorrow. N.p.o. at midnight  Consent for laparoscopic cholecystectomy and possible cholangiogram  Continue antibiotics    Today I discussed gallbladder disorders with the patient and her . The patient does have a history of Crohn's disease and subtotal colectomy. I have recommended a laparoscopic cholecystectomy.   I discussed the details of the procedure with him today and she will be scheduled tomorrow afternoon at the next available date and time.   --    Lina Evans, DO

## 2018-08-14 NOTE — PROGRESS NOTES
TRANSFER - OUT REPORT:    Verbal report given to Kevin Eduardo RN(name) on Chandu Anderson  being transferred to PreOp(unit) for ordered procedure       Report consisted of patients Situation, Background, Assessment and   Recommendations(SBAR). Information from the following report(s) SBAR and Kardex was reviewed with the receiving nurse. Lines:   Peripheral IV 08/14/18 Left Arm (Active)   Site Assessment Clean, dry, & intact 8/14/2018  8:15 AM   Phlebitis Assessment 0 8/14/2018  8:15 AM   Infiltration Assessment 0 8/14/2018  8:15 AM   Dressing Status Clean, dry, & intact 8/14/2018  8:15 AM   Dressing Type Transparent 8/14/2018  8:15 AM   Hub Color/Line Status Infusing;Flushed 8/14/2018  3:04 AM        Opportunity for questions and clarification was provided.       Patient transported with:   [employer]

## 2018-08-14 NOTE — INTERVAL H&P NOTE
H&P Update:  Cami [place] was seen and examined. History and physical has been reviewed. The patient has been examined.  There have been no significant clinical changes since the completion of the originally dated History and Physical.    Signed By: Kristal Marinelli DO     August 14, 2018 3:07 PM

## 2018-08-14 NOTE — PROGRESS NOTES
Pt assessment completed. Pt in bed. NAD noted. Pt aware of and understands NPO status after midnight. Informed PCT of pt need for hibiclens bath before surgery tomorrow AM. Call light and essentials within reach. Pt aware to call with needs. Will monitor.

## 2018-08-14 NOTE — PROGRESS NOTES
TRANSFER - IN REPORT:    Verbal report received from Yudelka Priest (name) on Bety Kemp  being received from 3rd med-surg   (unit) for ordered procedure      Report consisted of patients Situation, Background, Assessment and   Recommendations(SBAR). Information from the following report(s) SBAR, Kardex and MAR was reviewed with the receiving nurse. Opportunity for questions and clarification was provided. Assessment completed upon patients arrival to unit and care assumed.

## 2018-08-14 NOTE — BRIEF OP NOTE
BRIEF OPERATIVE NOTE    Date of Procedure: 8/14/2018   Preoperative Diagnosis: Acute cholecystitis [K81.0]  Postoperative Diagnosis: Acute cholecystitis [K81.0]    Procedure(s):  CHOLECYSTECTOMY LAPAROSCOPIC    Surgeon(s) and Role:     * Almas Lechuga DO - Primary         Surgical Assistant: None    Surgical Staff:  Circ-1: [facility]  Scrub Tech-1: Maisha Kapoor  Scrub Tech-2: Scot Enter  Event Time In   Incision Start 1558   Incision Close 1635     Anesthesia: General   Estimated Blood Loss: Minimal  Specimens:   ID Type Source Tests Collected by Time Destination   1 : GALLBLADDER Preservative Gallbladder  Almas Lechuga DO 8/14/2018 1626 Pathology      Findings: Normal appearing liver, GB with thickened wall and pericholecystic fluid, 5 mm cystic duct.    Complications: none  Implants: * No implants in log *    Gumaro Ha

## 2018-08-14 NOTE — PROGRESS NOTES
PM Shift Assessment - Patient is alert and oriented x4. Abdomen is soft and tender. Respirations are even and unlabored. Lung sounds are clear. No complaint of pain at this time. Currently resting in a low, locked bed with call light within reach.

## 2018-08-14 NOTE — PROGRESS NOTES
Called to pt room. Pt alert to self but a little confused as to where she was for a moment she stated. Pt jumped up to go to bathroom and pulled out IV and urinated on the floor a little. Assisted pt back to bed and cleaned up. Will monitor.

## 2018-08-14 NOTE — ANESTHESIA POSTPROCEDURE EVALUATION
Post-Anesthesia Evaluation and Assessment    Patient: Yanira Larry MRN: [id number]  SSN: [id number]    YOB: 1945  Age: 75 y.o. Sex: female       Cardiovascular Function/Vital Signs  Visit Vitals    /63    Pulse 85    Temp 37.4 °C (99.3 °F)    Resp 16    Ht 5' (1.524 m)    Wt 52.6 kg (116 lb)    SpO2 95%    BMI 22.65 kg/m2       Patient is status post general anesthesia for Procedure(s):  CHOLECYSTECTOMY LAPAROSCOPIC  . Nausea/Vomiting: None    Postoperative hydration reviewed and adequate. Pain:  Pain Scale 1: Visual (08/14/18 0500)  Pain Intensity 1: 0 (08/14/18 0500)   Managed    Neurological Status: At baseline    Mental Status and Level of Consciousness: Arousable    Pulmonary Status:   O2 Device: Nasal cannula (08/14/18 1701)   Adequate oxygenation and airway patent    Complications related to anesthesia: None    Post-anesthesia assessment completed.  No concerns    Signed By: Sherif Strickland MD     August 14, 2018

## 2018-08-14 NOTE — PROGRESS NOTES
TRANSFER - IN REPORT:    Verbal report received from Ferny (name) on Tani Costa  being received from PACU (unit) for routine post - op      Report consisted of patients Situation, Background, Assessment and   Recommendations(SBAR). Information from the following report(s) SBAR, Kardex and OR Summary was reviewed with the receiving nurse. Opportunity for questions and clarification was provided. Assessment completed upon patients arrival to unit and care assumed.

## 2018-08-14 NOTE — PROGRESS NOTES
Pt arrived back from PACU. Awake, alert and oriented x4. Respirations even and unlabored, lung sounds clear bilaterally on 2L O2 via NC. Abdomen soft and tender. Abdominal lap sites c/d/i with dermabond. Pt has not voided yet. SCDs placed. Pt aware she is on regular diet. She does not want anything now except water. No c/o nausea. C/o pain rated 4/10, [facility] Pt instructed to call for help OOB to bathroom. She denies any needs, all safety measures in place.

## 2018-08-15 VITALS
HEIGHT: 60 IN | OXYGEN SATURATION: 94 % | HEART RATE: 70 BPM | SYSTOLIC BLOOD PRESSURE: 113 MMHG | BODY MASS INDEX: 22.78 KG/M2 | WEIGHT: 116 LBS | TEMPERATURE: 98.2 F | RESPIRATION RATE: 18 BRPM | DIASTOLIC BLOOD PRESSURE: 65 MMHG

## 2018-08-15 PROCEDURE — 74011250636 HC RX REV CODE- 250/636: Performed by: SURGERY

## 2018-08-15 PROCEDURE — 74011250637 HC RX REV CODE- 250/637: Performed by: SURGERY

## 2018-08-15 PROCEDURE — 99218 HC RM OBSERVATION: CPT

## 2018-08-15 PROCEDURE — 77010033678 HC OXYGEN DAILY

## 2018-08-15 PROCEDURE — 74011000258 HC RX REV CODE- 258: Performed by: SURGERY

## 2018-08-15 PROCEDURE — 94760 N-INVAS EAR/PLS OXIMETRY 1: CPT

## 2018-08-15 RX ORDER — HYDROCODONE BITARTRATE AND ACETAMINOPHEN 7.5; 325 MG/1; MG/1
1 TABLET ORAL
Status: DISCONTINUED | OUTPATIENT
Start: 2018-08-15 | End: 2018-08-15 | Stop reason: HOSPADM

## 2018-08-15 RX ORDER — HYDROCODONE BITARTRATE AND ACETAMINOPHEN 5; 325 MG/1; MG/1
1 TABLET ORAL
Qty: 40 TAB | Refills: 0 | Status: SHIPPED | OUTPATIENT
Start: 2018-08-15 | End: 2018-10-22 | Stop reason: ALTCHOICE

## 2018-08-15 RX ADMIN — FAMOTIDINE 40 MG: 20 TABLET ORAL at 08:09

## 2018-08-15 RX ADMIN — LISINOPRIL AND HYDROCHLOROTHIAZIDE 1 TABLET: 12.5; 1 TABLET ORAL at 08:09

## 2018-08-15 RX ADMIN — DULOXETINE HYDROCHLORIDE 30 MG: 30 CAPSULE, DELAYED RELEASE ORAL at 08:10

## 2018-08-15 RX ADMIN — SODIUM CHLORIDE 1 G: 900 INJECTION, SOLUTION INTRAVENOUS at 03:00

## 2018-08-15 RX ADMIN — DESVENLAFAXINE SUCCINATE 50 MG: 50 TABLET, EXTENDED RELEASE ORAL at 08:09

## 2018-08-15 RX ADMIN — ROSUVASTATIN CALCIUM 10 MG: 5 TABLET, FILM COATED ORAL at 08:10

## 2018-08-15 RX ADMIN — FLUTICASONE PROPIONATE 2 SPRAY: 50 SPRAY, METERED NASAL at 08:14

## 2018-08-15 RX ADMIN — HYDROCODONE BITARTRATE AND ACETAMINOPHEN 1 TABLET: 7.5; 325 TABLET ORAL at 08:09

## 2018-08-15 NOTE — PROGRESS NOTES
[address]  [address], [address], [address]      PLAN:OK to DC home  Follow up 2 weeks  Norco RX  Regular diet  Ween O2  Ambulate  IS Q 1 hour while awake      ASSESSMENT:  Admit Date: 8/12/2018   1 Day Post-Op  Procedure(s):  CHOLECYSTECTOMY LAPAROSCOPIC      Principal Problem:    Biliary calculus with acute cholecystitis (8/13/2018)         Starr Folk well POD#1. VSS afebrile. Incisions clean, dry and intact. Ok to DC home. OBJECTIVE:  Constitutional: Alert oriented cooperative patient in no acute distress; appears stated age   Visit Vitals    /65 (BP 1 Location: Left arm, BP Patient Position: At rest;Supine)    Pulse 70    Temp 98.2 °F (36.8 °C)    Resp 18    Ht 5' (1.524 m)    Wt 116 lb (52.6 kg)    SpO2 94%    BMI 22.65 kg/m2     Eyes:Sclera are clear. ENMT: no external lesions gross hearing normal; no obvious neck masses, no ear or lip lesions  CV: RRR. Normal perfusion  Resp: No JVD. Breathing is  non-labored; no audible wheezing. GI: soft. incisions clean and dry. Steri strips intact. Musculoskeletal: unremarkable with normal function. No embolic signs or cyanosis.    Neuro:  Oriented; moves all 4; no focal deficits  Psychiatric: normal affect and mood, no memory impairment      Patient Vitals for the past 24 hrs:   BP Temp Pulse Resp SpO2   08/15/18 0809 - - - - 94 %   08/15/18 0753 113/65 98.2 °F (36.8 °C) 70 18 90 %   08/15/18 0359 104/52 97.8 °F (36.6 °C) 67 16 90 %   08/15/18 0027 98/59 97.9 °F (36.6 °C) 75 16 91 %   08/14/18 2128 101/64 98.1 °F (36.7 °C) 76 16 92 %   08/14/18 1804 143/78 97.7 °F (36.5 °C) 75 17 95 %   08/14/18 1746 135/56 - 74 16 96 %   08/14/18 1731 153/66 - 78 16 98 %   08/14/18 1716 128/58 - 75 16 98 %   08/14/18 1711 122/59 - 78 16 97 %   08/14/18 1706 129/58 - 80 16 98 %   08/14/18 1701 130/63 99.3 °F (37.4 °C) 85 16 95 %   08/14/18 1152 130/76 98.9 °F (37.2 °C) 83 19 93 %     Labs:  Recent Labs      08/12/18   2207   WBC  15.7*   HGB  13.1   PLT  232   NA  138   K  3.4*   CL  100   CO2  28   BUN  22   CREA  1.16*   GLU  118*   TBILI  0.5   SGOT  29   ALT  29   AP  83   LPSE  116         Naoma Aimee Parker,

## 2018-08-15 NOTE — OP NOTES
[facility] REPORT    [facility]  MR#: [id number]  : 1945  ACCOUNT #: [de-identified]   DATE OF SERVICE: 2018    PREOPERATIVE DIAGNOSIS:  Acute cholecystitis. POSTOPERATIVE DIAGNOSIS:  Acute cholecystitis. PROCEDURE PERFORMED:  Laparoscopic cholecystectomy. ANESTHESIA:  General endotracheal.    SURGEON:  Laura Trejo DO    ASSISTANT:  None. COMPLICATIONS:  None. CONDITION:  Stable. COUNTS:  Sponge count, needle count and instrument count all correct x3. SPECIMEN:  Gallbladder. IMPLANTS:  None. ESTIMATED BLOOD LOSS:  Minimal.    DESCRIPTION OF PROCEDURE:  This 57-year-old female admitted with right upper quadrant abdominal pain, gallbladder ultrasound with evidence of thickened gallbladder wall, pericholecystic fluid and findings of acute cholecystitis. She is prepared for laparoscopic cholecystectomy with possible cholangiogram.  Consent was obtained, placed upon scrubbed and the procedure to the patient including potential complications to include infection, bleeding, possible bile leak, bile duct injury or possible cholangiogram.  Consent was obtained, placed on the chart. She was administered Invanz IV preoperatively. She was taken to the operative suite, placed in a supine position. General anesthesia was initiated without complication. She was then prepped and draped in sterile fashion. Timeout was taken to confirm the patient's name and proper procedure. Following this, an infraumbilical incision was planned. 0.25% Marcaine with epinephrine was used to anesthetize the skin and subcutaneous tissue. A #11 scalpel blade was used to make a skin incision, Bovie cauterization was used to dissect down to the rectus fascia. The fascia was then incised and 0 Vicryl sutures were placed as anchoring stitches. The peritoneum was elevated between tonsil schnits, entered with the Metzenbaum scissors.   A Nikolas trocar was placed into the peritoneum, secured in place and a CO2 gas was used to create a pneumoperitoneum of 15 mmHg. The laparoscope was passed into the abdomen. A brief survey revealed a normal appearing liver and the gallbladder with a thickened wall and distention. The remaining 5 mm trocars were placed in the usual fashion with no bleeding. The gallbladder was then grasped and elevated over the dome of the liver exposing the Tristan's pouch. The anterior peritoneum was then taken down medially and laterally exposing a 5 mm cystic duct. The duct was easily skeletonized and 2 clips were placed proximally, one distally and the duct was divided. Cystic artery was identified within the triangle. Two clips were placed proximally, one distally and the artery was divided. The gallbladder was dissected off of the liver bed using spot cauterization. The gallbladder was retrieved and sent to pathology for analysis. We readmitted the scope, copiously irrigated with saline until clear, ensured hemostasis on the liver bed using spot cauterization. Once hemostasis was confirmed, we removed all trocars in the usual fashion with no evidence of bleeding. The periumbilical port site was closed with a 0 Vicryl in a figure-of-eight fashion. We irrigated once again with saline until clear. We approximated the skin edges with 3-0 Vicryl in simple running fashion. Mastisol and Steri-Strips were placed off the incision. Sterile dressing applied. The patient was extubated and transferred to recovery stable. FINDINGS:  A 65-year-old female with acute cholecystitis. Laparoscopic findings revealed a normal appearing liver, gallbladder with a thickened wall and pericholecystic fluid. The cystic duct was only 5 mm in diameter. Cholangiogram was not elected. She tolerated the procedure well. DO CORDELL RAMIREZ / VANESSA  D: 08/14/2018 16:43     T: 08/14/2018 21:09  JOB #: [id number]

## 2018-08-15 NOTE — DISCHARGE INSTRUCTIONS
DISCHARGE SUMMARY from Nurse    PATIENT INSTRUCTIONS:    After general anesthesia or intravenous sedation, for 24 hours or while taking prescription Narcotics:  · Limit your activities  · Do not drive and operate hazardous machinery  · Do not make important personal or business decisions  · Do  not drink alcoholic beverages  · If you have not urinated within 8 hours after discharge, please contact your surgeon on call. Report the following to your surgeon:  · Excessive pain, swelling, redness or odor of or around the surgical area  · Temperature over 100.5  · Nausea and vomiting lasting longer than 4 hours or if unable to take medications  · Any signs of decreased circulation or nerve impairment to extremity: change in color, persistent  numbness, tingling, coldness or increase pain  · Any questions    What to do at Home:  Recommended activity: Activity as tolerated, see MD order below. If you experience any of the following symptoms nausea, vomiting, chest pain, bleeding, uncontrolled pain, please follow up with MD.    *  Please give a list of your current medications to your Primary Care Provider. *  Please update this list whenever your medications are discontinued, doses are      changed, or new medications (including over-the-counter products) are added. *  Please carry medication information at all times in case of emergency situations. These are general instructions for a healthy lifestyle:    No smoking/ No tobacco products/ Avoid exposure to second hand smoke  Surgeon General's Warning:  Quitting smoking now greatly reduces serious risk to your health.     Obesity, smoking, and sedentary lifestyle greatly increases your risk for illness    A healthy diet, regular physical exercise & weight monitoring are important for maintaining a healthy lifestyle    You may be retaining fluid if you have a history of heart failure or if you experience any of the following symptoms:  Weight gain of 3 pounds or more overnight or 5 pounds in a week, increased swelling in our hands or feet or shortness of breath while lying flat in bed. Please call your doctor as soon as you notice any of these symptoms; do not wait until your next office visit. Recognize signs and symptoms of STROKE:    F-face looks uneven    A-arms unable to move or move unevenly    S-speech slurred or non-existent    T-time-call 911 as soon as signs and symptoms begin-DO NOT go       Back to bed or wait to see if you get better-TIME IS BRAIN. Warning Signs of HEART ATTACK     Call 911 if you have these symptoms:   Chest discomfort. Most heart attacks involve discomfort in the center of the chest that lasts more than a few minutes, or that goes away and comes back. It can feel like uncomfortable pressure, squeezing, fullness, or pain.  Discomfort in other areas of the upper body. Symptoms can include pain or discomfort in one or both arms, the back, neck, jaw, or stomach.  Shortness of breath with or without chest discomfort.  Other signs may include breaking out in a cold sweat, nausea, or lightheadedness. Don't wait more than five minutes to call 911 - MINUTES MATTER! Fast action can save your life. Calling 911 is almost always the fastest way to get lifesaving treatment. Emergency Medical Services staff can begin treatment when they arrive -- up to an hour sooner than if someone gets to the hospital by car. The discharge information has been reviewed with the patient. The patient verbalized understanding. Discharge medications reviewed with the patient and appropriate educational materials and side effects teaching were provided. ___________________________________________________________________________________________________________________________________  Post op instructions:  1. May shower only, no tub bathing, no hot tub, no swimming. 2. Keep incision clean with regular soap and water.   3. No lifting over 10 lbs.  4. Regular diet as tolerated. 5. May remove topical dressing on Day #2. Leave steri strips until seen in Dr. Evans's office at follow up appointment. 6. No driving on pain medicines. 7. Resume home medicines as usual.     Florentina Mcardle, DO               Cholecystectomy: What to Expect at [address] [place] [address]  After your surgery, it is normal to feel weak and tired for several days after you return home. Your belly may be swollen. If you had laparoscopic surgery, you may also have pain in your shoulder for about 24 hours. You may have gas or need to burp a lot at first, and a few people get diarrhea. The diarrhea usually goes away in 2 to 4 weeks, but it may last longer. How quickly you recover depends on whether you had a laparoscopic or open surgery. · For a laparoscopic surgery, most people can go back to work or their normal routine in 1 to 2 weeks, but it may take longer, depending on the type of work you do. · For an open surgery, it will probably take 4 to 6 weeks before you get back to your normal routine. This care sheet gives you a general idea about how long it will take for you to recover. However, each person recovers at a different pace. Follow the steps below to get better as quickly as possible. How can you care for yourself at home? Activity    · Rest when you feel tired. Getting enough sleep will help you recover.     · Try to walk each day. Start out by walking a little more than you did the day before. Gradually increase the amount you walk. Walking boosts blood flow and helps prevent pneumonia and constipation.     · For about 2 to 4 weeks, avoid lifting anything that would make you strain.  This may include a child, heavy grocery bags and milk containers, a heavy briefcase or backpack, cat litter or dog food bags, or a vacuum .     · Avoid strenuous activities, such as biking, jogging, weightlifting, and aerobic exercise, until your doctor says it is okay.     · You may shower 24 to 48 hours after surgery, if your doctor okays it. Pat the cut (incision) dry. Do not take a bath for the first 2 weeks, or until your doctor tells you it is okay.     · You may drive when you are no longer taking pain medicine and can quickly move your foot from the gas pedal to the brake. You must also be able to sit comfortably for a long period of time, even if you do not plan to go far. You might get caught in traffic.     · For a laparoscopic surgery, most people can go back to work or their normal routine in 1 to 2 weeks, but it may take longer. For an open surgery, it will probably take 4 to 6 weeks before you get back to your normal routine.     · Your doctor will tell you when you can have sex again.    Diet    · Eat smaller meals more often instead of fewer larger meals. You can eat a normal diet, but avoid eating fatty foods for about 1 month. Fatty foods include hamburger, whole milk, cheese, and many snack foods. If your stomach is upset, try bland, low-fat foods like plain rice, broiled chicken, toast, and yogurt.     · Drink plenty of fluids (unless your doctor tells you not to).   · If you have diarrhea, try avoiding spicy foods, dairy products, fatty foods, and alcohol. You can also watch to see if specific foods cause it, and stop eating them. If the diarrhea continues for more than 2 weeks, talk to your doctor.     · You may notice that your bowel movements are not regular right after your surgery. This is common. Try to avoid constipation and straining with bowel movements. You may want to take a fiber supplement every day. If you have not had a bowel movement after a couple of days, ask your doctor about taking a mild laxative. Medicines    · Your doctor will tell you if and when you can restart your medicines.  He or she will also give you instructions about taking any new medicines.     · If you take blood thinners, such as warfarin (Coumadin), clopidogrel (Plavix), or aspirin, be sure to talk to your doctor. He or she will tell you if and when to start taking those medicines again. Make sure that you understand exactly what your doctor wants you to do.     · Take pain medicines exactly as directed. ¨ If the doctor gave you a prescription medicine for pain, take it as prescribed. ¨ If you are not taking a prescription pain medicine, take an over-the-counter medicine such as acetaminophen (Tylenol), ibuprofen (Advil, Motrin), or naproxen (Aleve). Read and follow all instructions on the label. ¨ Do not take two or more pain medicines at the same time unless the doctor told you to. Many pain medicines contain acetaminophen, which is Tylenol. Too much Tylenol can be harmful.     · If you think your pain medicine is making you sick to your stomach:  ¨ Take your medicine after meals (unless your doctor tells you not to). ¨ Ask your doctor for a different pain medicine.     · If your doctor prescribed antibiotics, take them as directed. Do not stop taking them just because you feel better. You need to take the full course of antibiotics. Incision care    · If you have strips of tape on the incision, or cut, leave the tape on for a week or until it falls off.     · After 24 to 48 hours, wash the area daily with warm, soapy water, and pat it dry.     · You may have staples to hold the cut together. Keep them dry until your doctor takes them out. This is usually in 7 to 10 days.     · Keep the area clean and dry. You may cover it with a gauze bandage if it weeps or rubs against clothing. Change the bandage every day.    Ice    · To reduce swelling and pain, put ice or a cold pack on your belly for 10 to 20 minutes at a time. Do this every 1 to 2 hours. Put a thin cloth between the ice and your skin. Follow-up care is a key part of your treatment and safety. Be sure to make and go to all appointments, and call your doctor if you are having problems.  It's also a good idea to know your test results and keep a list of the medicines you take. When should you call for help? Call 911 anytime you think you may need emergency care. For example, call if:    · You passed out (lost consciousness).     · You are short of breath. Mary Buchananker your doctor now or seek immediate medical care if:    · You are sick to your stomach and cannot drink fluids.     · You have pain that does not get better when you take your pain medicine.     · You cannot pass stools or gas.     · You have signs of infection, such as:  ¨ Increased pain, swelling, warmth, or redness. ¨ Red streaks leading from the incision. ¨ Pus draining from the incision. ¨ A fever.     · Bright red blood has soaked through the bandage over your incision.     · You have loose stitches, or your incision comes open.     · You have signs of a blood clot in your leg (called a deep vein thrombosis), such as:  ¨ Pain in your calf, back of knee, thigh, or groin. ¨ Redness and swelling in your leg or groin.    Watch closely for any changes in your health, and be sure to contact your doctor if you have any problems. Where can you learn more? Go to http://jacinta-power.info/. Enter 479 08 [id number] in the search box to learn more about \"Cholecystectomy: What to Expect at Home. \"  Current as of: May 12, 2017  Content Version: 11.7  © 1830-4971 [employer]. Care instructions adapted under license by [employer] (which disclaims liability or warranty for this information). If you have questions about a medical condition or this instruction, always ask your healthcare professional. [place] [address] any warranty or liability for your use of this information. Secondhand Smoke: Care Instructions  Your Care Instructions    Secondhand smoke comes from the burning end of a cigarette, cigar, or pipe and the smoke that a smoker exhales. The smoke contains nicotine and many other harmful chemicals.  Breathing secondhand smoke can cause or worsen health problems including cancer, asthma, coronary artery disease, and respiratory infections. It can make your eyes and nose burn and cause a sore throat. Secondhand smoke is especially bad for babies and young children whose lungs are still developing. Babies whose parents smoke are more likely to have ear infections, pneumonia, and bronchitis in the first few years of their lives. Secondhand smoke can make asthma symptoms worse in children. If you are pregnant, it is important that you not smoke and that you avoid secondhand smoke. You are more likely to give birth to a baby who weighs less than expected (low birth weight) if you smoke. And your baby may have a greater risk for sudden infant death syndrome (SIDS). Babies whose mothers are exposed to secondhand smoke during pregnancy have a higher risk for health problems. Follow-up care is a key part of your treatment and safety. Be sure to make and go to all appointments, and call your doctor if you are having problems. It's also a good idea to know your test results and keep a list of the medicines you take. How can you care for yourself at home? · Do not smoke or let anyone else smoke in your home. If people must smoke, ask them to go outside. · If people do smoke in your home, choose a room where you can open a window or use a fan to get the smoke outside. · Do not let anyone smoke in your car. If someone must smoke, pull over in a safe place and let him or her smoke away from the car. · Ask your employer to make sure that you have a smoke-free work area. · Make sure that your children are not exposed to secondhand smoke at day care, school, and after-school programs. · Try to choose nonsmoking bars, restaurants, and other public places when you go out. · Help your family and friends who smoke to quit by encouraging them to try. Tell them about treatment resources. Having support from others often helps.   · If you smoke, quit. Quitting is hard, but there are ways to boost your chance of quitting tobacco for good. ¨ Use nicotine gum, patches, or lozenges. Call a quitline. Ask your doctor about stop-smoking programs and medicines. ¨ Keep trying. When should you call for help? Watch closely for changes in your health, and be sure to contact your doctor if you have any problems. Where can you learn more? Go to http://jacinta-power.info/. Enter L004 in the search box to learn more about \"Secondhand Smoke: Care Instructions. \"  Current as of: November 29, 2017  Content Version: 11.7  © 8966-5001 [employer]. Care instructions adapted under license by the gra[employer] (which disclaims liability or warranty for this information). If you have questions about a medical condition or this instruction, always ask your healthcare professional. [address] any warranty or liability for your use of this information. Hand-Washing: Care Instructions  Your Care Instructions  It is important for caregivers to wash their hands properly. This is the single best way to prevent the spread of infections. Hand-washing can help keep you from getting sick. It is easy, doesn't cost much, and it works. Make sure that you and your caregivers follow safe hand-washing routines. Caregivers may include health care workers or family members at home or in a care facility. You can talk to them about this information on hand-washing. Follow-up care is a key part of your treatment and safety. Be sure to make and go to all appointments, and call your doctor if you are having problems. It's also a good idea to know your test results and keep a list of the medicines you take. How can you care for yourself at home? · Caregivers should wash their hands with soap and water:  ¨ When their hands are dirty, especially after being exposed to body fluids. This includes blood.   ¨ When their hands may have been exposed to germs that could spread infection. ¨ After they touch broken skin, sores, or wound bandages. ¨ After they use the bathroom. · At other times, caregivers can use an alcohol-based gel  or soap and water to clean hands. This should be done:  ¨ Before and after any contact with you. ¨ After they take off gloves. ¨ Before they handle a device that touches your body (even if gloves are used). ¨ After they touch any objects near you, such as medical equipment, lights, or doorknobs. ¨ Before they handle medicine or prepare food. Proper hand-washing for caregivers  · When using an alcohol-based gel , fill your palm with the gel. Then spread it all over your hands. Rub your hands together until they are dry. · When washing hands with soap and water:  ¨ Wet your hands with running water, and apply soap. ¨ Rub your hands together to make a lather. Scrub well for at least 20 seconds. ¨ Pay special attention to your wrists, the backs of your hands, between your fingers, and under your fingernails. ¨ Rinse your hands well under running water. ¨ Use a clean towel to dry your hands, or air-dry your hands. You may want to use a clean towel as a barrier between the faucet and your clean hands when you turn off the water. · If you use bar soap, use small bars. Set the soap on a rack that lets water drain. Where can you learn more? Go to http://jacinta-power.info/. Enter C114 in the search box to learn more about \"Hand-Washing: Care Instructions. \"  Current as of: November 18, 2017  Content Version: 11.7  © 1538-7846 [employer]. Care instructions adapted under license by [employer] (which disclaims liability or warranty for this information).  If you have questions about a medical condition or this instruction, always ask your healthcare professional. [address] any warranty or liability for your use of this information.

## 2018-08-15 NOTE — PROGRESS NOTES
Shift assessment complete. Pt resting quietly in bed. Alert and oriented x4. Resp even and unlabored. 4 PS to abdomen c/d/i. PT c/o pain, medicated with Norco 7.5 mg. No complaints at this time. Call light within reach. Pt instructed to call for assistance.

## 2018-10-22 PROBLEM — K80.20 CALCULUS OF GALLBLADDER WITHOUT CHOLECYSTITIS: Status: RESOLVED | Noted: 2018-07-23 | Resolved: 2018-10-22

## 2018-10-22 PROBLEM — K80.00 BILIARY CALCULUS WITH ACUTE CHOLECYSTITIS: Status: RESOLVED | Noted: 2018-08-13 | Resolved: 2018-10-22

## 2018-10-30 ENCOUNTER — HOSPITAL ENCOUNTER (OUTPATIENT)
Dept: PHYSICAL THERAPY | Age: 73
End: 2018-10-30

## 2018-11-01 ENCOUNTER — HOSPITAL ENCOUNTER (OUTPATIENT)
Dept: PHYSICAL THERAPY | Age: 73
Discharge: HOME | End: 2018-11-01
Payer: MEDICARE

## 2018-11-01 PROCEDURE — G8984 CARRY CURRENT STATUS: HCPCS

## 2018-11-01 PROCEDURE — G8985 CARRY GOAL STATUS: HCPCS

## 2018-11-01 PROCEDURE — 97161 PT EVAL LOW COMPLEX 20 MIN: CPT

## 2018-11-01 PROCEDURE — 97110 THERAPEUTIC EXERCISES: CPT

## 2018-11-01 NOTE — PROGRESS NOTES
Ambulatory/Rehab Services H2 Model Falls Risk Assessment Risk Factor Pts. ·  
Confusion/Disorientation/Impulsivity  []    4 · Symptomatic Depression  []   2 · Altered Elimination  []   1 · Dizziness/Vertigo  []   1 · Gender (Male)  []   1 · Any administered antiepileptics (anticonvulsants):  []   2 · Any administered benzodiazepines:  []   1 · Visual Impairment (specify):  []   1 · Portable Oxygen Use  []   1 · Orthostatic ? BP  []   1 · History of Recent Falls (within 3 mos.)  []   5 Ability to Rise from Chair (choose one) Pts. ·  
Ability to rise in a single movement  [x]   0 · Pushes up, successful in one attempt  []   1 · Multiple attempts, but successful  []   3 · Unable to rise without assistance  []   4 Total: (5 or greater = High Risk) 0 Falls Prevention Plan: 
 []                Physical Limitations to Exercise (specify): 
 []                Mobility Assistance Device (type): 
 []                Exercise/Equipment Adaptation (specify): 
 
©2010 [facility] [place] Patent #[id number]. Federal Law prohibits the replication, distribution or use without written permission from [facility] [employer]

## 2018-11-01 NOTE — THERAPY EVALUATION
Augusta Abts : 1945 [place] Secondary: 10 Merlene Garcia at [address] [facility] [phone]   [phone] OUTPATIENT PHYSICAL THERAPY:Initial Assessment and Daily Note 2018 ICD-10: Treatment Diagnosis: Pain in right shoulder (M25.511), Muscle weakness, generalized (M62.81) Precautions/Allergies:  
Levaquin [levofloxacin]; Celebrex [celecoxib]; Cephalexin; Codeine; Dilaudid [hydromorphone]; Pcn [penicillins]; and Prilosec [omeprazole] Fall Risk Score: 0 (? 5 = High Risk) MD Orders: Evaluate and Treat MEDICAL/REFERRING DIAGNOSIS: 
Pain in right shoulder [M25.511] DATE OF ONSET: 2018 REFERRING PHYSICIAN: Maurice Dubon MD 
RETURN PHYSICIAN APPOINTMENT: TBD INITIAL ASSESSMENT:  Ms. Amina Tanner presents with right shoulder pain which began a few months ago without known cause. Her chief complaint is pain but also presents with decreased shoulder mobility and strength which limits her ability to perform ADL and self care tasks without pain or difficulty. She will benefit from skilled therapy to improve her pain, strength and mobility to return to prior level of function. PROBLEM LIST (Impacting functional limitations): 1. Decreased Strength 2. Decreased ADL/Functional Activities 3. Increased Pain 4. Decreased Activity Tolerance 5. Decreased Flexibility/Joint Mobility 6. Decreased [place] with Home Exercise Program INTERVENTIONS PLANNED: 
1. Home Exercise Program (HEP) 2. Manual Therapy 3. Range of Motion (ROM) 4. Therapeutic Exercise/Strengthening TREATMENT PLAN: 
Effective Dates: 2018 TO 2018 (60 days). Frequency/Duration: 2 times a week for 60 Day(s) GOALS: (Goals have been discussed and agreed upon with patient.) Discharge Goals: Time Frame: 60 days 1.  Patient will be independent with home exercise program without assistance from therapist.  
2. Patient will report Quick DASH score to 20/55 or less to demonstrate improved functional capacity. 3. Patient will demonstrate pain free shoulder overhead flexion to return to normal ADL function without difficulty. 4. Patient will perform full and symmetrical shoulder IR behind her back to demonstrate improved functional mobility. 5. Patient will perform lift, carry and place with #10 or greater to demonstrate improved UE strength. Rehabilitation Potential For Stated Goals: Good The information in this section was collected on 18 (except where otherwise noted). HISTORY:  
History of Present Injury/Illness (Reason for Referral): Dev Eller presents with right shoulder pain which has persisted over the past few months. She reports undergoing gal bladder surgery in August where she spent a few days in bed and due to prolonged bed rest, she thinks she aggravated her shoulder while reaching frequently to her bed side table. Her pain is worsened by lifting and reaching across her body. She would like to reduce her pain and improve her strength. Past Medical History/Comorbidities: Ms. Amina Tanner  has a past medical history of Anemia, Angioleiomyoma, Arthritis, Bronchiectasis ([address] Bronchiectasis ([address] Calculus of gallbladder without cholecystitis, Cancer ([address] Chronic kidney disease, Crohn's disease ([address] Depression, Dry eye, Fibromyalgia, Hypercholesterolemia, Hypertension, Multiple renal cysts, JASON (obstructive sleep apnea), and Renal cysts, acquired, bilateral.  Ms. Amina Tanner  has a past surgical history that includes hx colectomy; hx  section; hx cholecystectomy (2018); and CHOLECYSTECTOMY LAPAROSCOPIC   (N/A, 2018). Social History/Living Environment:  
  Lives in private setting home, no barriers to progress. Prior Level of Function/Work/Activity: 
Retired [employer]:  
      RIGHT Current Medications: Current Outpatient Medications:  
  zolpidem (AMBIEN) 5 mg tablet, Take 1 Tab by mouth nightly. Max Daily Amount: 5 mg., Disp: 90 Tab, Rfl: 0 
  famotidine (PEPCID) 20 mg tablet, Take 20 mg by mouth daily. , Disp: , Rfl:  
  lisinopril-hydroCHLOROthiazide (PRINZIDE, ZESTORETIC) 10-12.5 mg per tablet, Take 1 Tab by mouth daily. , Disp: 90 Tab, Rfl: 3 
  rosuvastatin (CRESTOR) 10 mg tablet, Take 1 Tab by mouth nightly., Disp: 90 Tab, Rfl: 3 
  desvenlafaxine succinate (PRISTIQ) 50 mg ER tablet, Take 1 Tab by mouth daily. , Disp: 90 Tab, Rfl: 3 
  DULoxetine (CYMBALTA) 30 mg capsule, Take 1 Cap by mouth daily. , Disp: 90 Cap, Rfl: 3 
  fluticasone (FLONASE) 50 mcg/actuation nasal spray, 2 Sprays by Both Nostrils route daily. , Disp: 3 Bottle, Rfl: 3 
  clorazepate (TRANXENE) 3.75 mg tablet, Take 1 Tab by mouth two (2) times daily as needed. Max Daily Amount: 7.5 mg. (Patient taking differently: Take 3.75 mg by mouth three (3) times daily.), Disp: 180 Tab, Rfl: 1   cholecalciferol, VITAMIN D3, (VITAMIN D3) 5,000 unit tab tablet, Take  by mouth daily. , Disp: , Rfl:  
  cyanocobalamin (VITAMIN B12) 500 mcg tablet, Take 500 mcg by mouth daily. , Disp: , Rfl:  
  acetaminophen (TYLENOL) 500 mg tablet, Take 1,000 mg by mouth daily as needed for Pain., Disp: , Rfl:   
Date Last Reviewed:  11/1/2018 Number of Personal Factors/Comorbidities that affect the Plan of Care: 0: LOW COMPLEXITY EXAMINATION:  
Observation/Orthostatic Postural Assessment:  Patient exhibits mild rounded shoulders and forward head posture. ROM:    
  RIGHT LEFT Shoulder flexion  WNL, painful at end range WNL Shoulder external rotation WNL WNL Shoulder internal rotation WNL WNL Strength:    
  RIGHT  LEFT Shoulder flexion  4/5 5/5 Shoulder ER 4/5, painful 5/5 Shoulder IR 5/5 5/5 Scapular retraction 4/5 4/5 Functional Mobility:    
  RIGHT LEFT Apley IR  L5 L1  Apley ER T2 T2  
 Horizontal adduction Painful Normal  
 Active impingement  Painful Normal  
     
 Joint Mobility:     
  RIGHT LEFT Glenohumeral Hypomobile Normal  
     
 
 
  
Body Structures Involved: 1. Bones 2. Joints 3. Muscles Body Functions Affected: 1. Sensory/Pain 2. Neuromusculoskeletal 
3. Movement Related Activities and Participation Affected: 1. General Tasks and Demands 2. Mobility 3. Self Care 4. Domestic Life 5. Community, Social and [place] Number of elements (examined above) that affect the Plan of Care: 4+: HIGH COMPLEXITY CLINICAL PRESENTATION:  
Presentation: Stable and uncomplicated: LOW COMPLEXITY CLINICAL DECISION MAKING:  
Outcome Measure: Tool Used: Disabilities of the Arm, Shoulder and Hand (DASH) Questionnaire - Quick Version Score:  Initial: 23/55 (11/1/18) Most Recent: X/55 (Date: -- ) Interpretation of Score: The DASH is designed to measure the activities of daily living in person's with upper extremity dysfunction or pain. Each section is scored on a 1-5 scale, 5 representing the greatest disability. The scores of each section are added together for a total score of 55. Score 11 12-19 20-28 29-37 38-45 46-54 55 Modifier CH CI CJ CK CL CM CN ? Carrying, Moving, and Handling Objects:  
  - CURRENT STATUS: CJ - 20%-39% impaired, limited or restricted  - GOAL STATUS: CI - 1%-19% impaired, limited or restricted  - D/C STATUS:  ---------------To be determined--------------- Medical Necessity:  
· Patient is expected to demonstrate progress in strength and range of motion to increase independence with ADL tasks. Reason for Services/Other Comments: 
· Patient continues to require present interventions due to patient's inability to reach across her body and lift heavy objects without pain.   
Use of outcome tool(s) and clinical judgement create a POC that gives a: Clear prediction of patient's progress: LOW COMPLEXITY  
  
 
 
 
TREATMENT:  
 (In addition to Assessment/Re-Assessment sessions the following treatments were rendered) Pre-treatment Symptoms/Complaints:  Patient says her shoulder has continued to feel sore over the past few weeks. Pain: Initial:  
Pain Intensity 1: 4 /10 Post Session:  2/10 Therapeutic Exercise: (15 Minutes):  Exercises per grid below to improve mobility and strength. Required moderate visual, verbal and manual cues to promote proper body alignment, promote proper body posture and promote proper body mechanics. Progressed resistance, range, repetitions and complexity of movement as indicated. Date: 
11/1/2018 Activity/Exercise Parameters Patient Education Plan of care, HEP Posture Review 2 min Scapular retraction 2 x 10 Counter walk back x20 Pendulum 3 min Treatment/Session Assessment:   
· Response to Treatment:  Patient has good tolerance to initial HEP tasks and reports slight decrease in symptoms at end of session. · Compliance with Program/Exercises: Compliant most of the time. · Recommendations/Intent for next treatment session: \"Next visit will focus on advancements to more challenging activities\". Total Treatment Duration: 45 minutes evaluation, 15 minutes treatment PT Patient Time In/Time Out Time In: 1430 Time Out: 1530 Frida Hurst. [facility] Future Appointments Date Time Provider Emily Tuttle 11/12/2018  1:00 PM Temo GAN [facility]  
11/27/2018 11:30 AM Temo GAN [facility]  
1/15/2019 10:00 AM [facility] LAB [facility]  
1/22/2019 11:00 AM Velia Mendoza MD [facility]

## 2018-11-12 ENCOUNTER — HOSPITAL ENCOUNTER (OUTPATIENT)
Dept: PHYSICAL THERAPY | Age: 73
Discharge: HOME | End: 2018-11-12
Payer: MEDICARE

## 2018-11-12 PROCEDURE — 97140 MANUAL THERAPY 1/> REGIONS: CPT

## 2018-11-12 PROCEDURE — 97110 THERAPEUTIC EXERCISES: CPT

## 2018-11-12 NOTE — PROGRESS NOTES
Radha Allan : 1945 [place] Secondary: [address] at [address] [phone]   [phone] OUTPATIENT PHYSICAL THERAPY:Daily Note 2018 ICD-10: Treatment Diagnosis: Pain in right shoulder (M25.511), Muscle weakness, generalized (M62.81) Precautions/Allergies:  
Levaquin [levofloxacin]; Celebrex [celecoxib]; Cephalexin; Codeine; Dilaudid [hydromorphone]; Pcn [penicillins]; and Prilosec [omeprazole] Fall Risk Score: 0 (? 5 = High Risk) MD Orders: Evaluate and Treat MEDICAL/REFERRING DIAGNOSIS: 
Pain in right shoulder [M25.511] DATE OF ONSET: 2018 REFERRING PHYSICIAN: Marko De León MD 
RETURN PHYSICIAN APPOINTMENT: TBD INITIAL ASSESSMENT:  Ms. Daniella Edge presents with right shoulder pain which began a few months ago without known cause. Her chief complaint is pain but also presents with decreased shoulder mobility and strength which limits her ability to perform ADL and self care tasks without pain or difficulty. She will benefit from skilled therapy to improve her pain, strength and mobility to return to prior level of function. PROBLEM LIST (Impacting functional limitations): 1. Decreased Strength 2. Decreased ADL/Functional Activities 3. Increased Pain 4. Decreased Activity Tolerance 5. Decreased Flexibility/Joint Mobility 6. Decreased [place] with Home Exercise Program INTERVENTIONS PLANNED: 
1. Home Exercise Program (HEP) 2. Manual Therapy 3. Range of Motion (ROM) 4. Therapeutic Exercise/Strengthening TREATMENT PLAN: 
Effective Dates: 2018 TO 2018 (60 days). Frequency/Duration: 2 times a week for 60 Day(s) GOALS: (Goals have been discussed and agreed upon with patient.) Discharge Goals: Time Frame: 60 days 1.  Patient will be independent with home exercise program without assistance from therapist.  
 2. Patient will report Quick DASH score to 20/55 or less to demonstrate improved functional capacity. 3. Patient will demonstrate pain free shoulder overhead flexion to return to normal ADL function without difficulty. 4. Patient will perform full and symmetrical shoulder IR behind her back to demonstrate improved functional mobility. 5. Patient will perform lift, carry and place with #10 or greater to demonstrate improved UE strength. Rehabilitation Potential For Stated Goals: Good The information in this section was collected on 18 (except where otherwise noted). HISTORY:  
History of Present Injury/Illness (Reason for Referral): [facility] presents with right shoulder pain which has persisted over the past few months. She reports undergoing gal bladder surgery in August where she spent a few days in bed and due to prolonged bed rest, she thinks she aggravated her shoulder while reaching frequently to her bed side table. Her pain is worsened by lifting and reaching across her body. She would like to reduce her pain and improve her strength. Past Medical History/Comorbidities: Ms. Letty Zarate  has a past medical history of Anemia, Angioleiomyoma, Arthritis, Bronchiectasis ([address] Bronchiectasis ([address] Calculus of gallbladder without cholecystitis, Cancer ([address] Chronic kidney disease, Crohn's disease ([address] Depression, Dry eye, Fibromyalgia, Hypercholesterolemia, Hypertension, Multiple renal cysts, JASON (obstructive sleep apnea), and Renal cysts, acquired, bilateral.  Ms. Letty Zarate  has a past surgical history that includes hx colectomy; hx  section; hx cholecystectomy (2018); and CHOLECYSTECTOMY LAPAROSCOPIC   (N/A, 2018). Social History/Living Environment:  
  Lives in private setting home, no barriers to progress. Prior Level of Function/Work/Activity: 
Retired [employer]:  
      RIGHT Current Medications:   
  
Current Outpatient Medications:   zolpidem (AMBIEN) 5 mg tablet, Take 1 Tab by mouth nightly. Max Daily Amount: 5 mg., Disp: 90 Tab, Rfl: 0 
  famotidine (PEPCID) 20 mg tablet, Take 20 mg by mouth daily. , Disp: , Rfl:  
  lisinopril-hydroCHLOROthiazide (PRINZIDE, ZESTORETIC) 10-12.5 mg per tablet, Take 1 Tab by mouth daily. , Disp: 90 Tab, Rfl: 3 
  rosuvastatin (CRESTOR) 10 mg tablet, Take 1 Tab by mouth nightly., Disp: 90 Tab, Rfl: 3 
  desvenlafaxine succinate (PRISTIQ) 50 mg ER tablet, Take 1 Tab by mouth daily. , Disp: 90 Tab, Rfl: 3 
  DULoxetine (CYMBALTA) 30 mg capsule, Take 1 Cap by mouth daily. , Disp: 90 Cap, Rfl: 3 
  fluticasone (FLONASE) 50 mcg/actuation nasal spray, 2 Sprays by Both Nostrils route daily. , Disp: 3 Bottle, Rfl: 3 
  clorazepate (TRANXENE) 3.75 mg tablet, Take 1 Tab by mouth two (2) times daily as needed. Max Daily Amount: 7.5 mg. (Patient taking differently: Take 3.75 mg by mouth three (3) times daily.), Disp: 180 Tab, Rfl: 1   cholecalciferol, VITAMIN D3, (VITAMIN D3) 5,000 unit tab tablet, Take  by mouth daily. , Disp: , Rfl:  
  cyanocobalamin (VITAMIN B12) 500 mcg tablet, Take 500 mcg by mouth daily. , Disp: , Rfl:  
  acetaminophen (TYLENOL) 500 mg tablet, Take 1,000 mg by mouth daily as needed for Pain., Disp: , Rfl:   
Date Last Reviewed:  11/12/2018 Number of Personal Factors/Comorbidities that affect the Plan of Care: 0: LOW COMPLEXITY EXAMINATION:  
Observation/Orthostatic Postural Assessment:  Patient exhibits mild rounded shoulders and forward head posture. ROM:    
  RIGHT LEFT Shoulder flexion  WNL, painful at end range WNL Shoulder external rotation WNL WNL Shoulder internal rotation WNL WNL Strength:    
  RIGHT  LEFT Shoulder flexion  4/5 5/5 Shoulder ER 4/5, painful 5/5 Shoulder IR 5/5 5/5 Scapular retraction 4/5 4/5 Functional Mobility:    
  RIGHT LEFT Apley IR  L5 L1 Apley ER T2 T2  Horizontal adduction Painful Normal  
 Active impingement  Painful Normal  
     
 Joint Mobility:     
  RIGHT LEFT Glenohumeral Hypomobile Normal  
     
 
 
  
Body Structures Involved: 1. Bones 2. Joints 3. Muscles Body Functions Affected: 1. Sensory/Pain 2. Neuromusculoskeletal 
3. Movement Related Activities and Participation Affected: 1. General Tasks and Demands 2. Mobility 3. Self Care 4. Domestic Life 5. Community, Social and [place] Number of elements (examined above) that affect the Plan of Care: 4+: HIGH COMPLEXITY CLINICAL PRESENTATION:  
Presentation: Stable and uncomplicated: LOW COMPLEXITY CLINICAL DECISION MAKING:  
Outcome Measure: Tool Used: Disabilities of the Arm, Shoulder and Hand (DASH) Questionnaire - Quick Version Score:  Initial: 23/55 (11/1/18) Most Recent: X/55 (Date: -- ) Interpretation of Score: The DASH is designed to measure the activities of daily living in person's with upper extremity dysfunction or pain. Each section is scored on a 1-5 scale, 5 representing the greatest disability. The scores of each section are added together for a total score of 55. Score 11 12-19 20-28 29-37 38-45 46-54 55 Modifier CH CI CJ CK CL CM CN ? Carrying, Moving, and Handling Objects:  
  - CURRENT STATUS: CJ - 20%-39% impaired, limited or restricted  - GOAL STATUS: CI - 1%-19% impaired, limited or restricted  - D/C STATUS:  ---------------To be determined--------------- Medical Necessity:  
· Patient is expected to demonstrate progress in strength and range of motion to increase independence with ADL tasks. Reason for Services/Other Comments: 
· Patient continues to require present interventions due to patient's inability to reach across her body and lift heavy objects without pain.   
Use of outcome tool(s) and clinical judgement create a POC that gives a: Clear prediction of patient's progress: LOW COMPLEXITY  
  
 
 
 
TREATMENT:  
 (In addition to Assessment/Re-Assessment sessions the following treatments were rendered) Pre-treatment Symptoms/Complaints:  Patient says her shoulder feels better today than last visit. Pain: Initial:  
Pain Intensity 1: 2 /10 Post Session:  0/10 Therapeutic Exercise: (45 Minutes):  Exercises per grid below to improve mobility and strength. Required moderate visual, verbal and manual cues to promote proper body alignment, promote proper body posture and promote proper body mechanics. Progressed resistance, range, repetitions and complexity of movement as indicated. Date: 
11/12/2018 Activity/Exercise Parameters Patient Education Plan of care, HEP  
AROM progression Supine punch x 20, flexion x 20, Sidelying ER x 20 Scapular retraction 2 x 10 Counter walk back x20 Pendulum 1 min x 2 Foam roll flexion 2 x 10 UE ranger flexion 2 x 10 Manual Therapy (    Soft Tissue Mobilization Duration Duration: 15 Minutes): Manual techniques to facilitate improved motion and decreased pain. (Used abbreviations: MET - muscle energy technique; PNF - proprioceptive neuromuscular facilitation; NMR - neuromuscular re-education; a/p - anterior to posterior; p/a - posterior to anterior) · Glenohumeral joint mobilizations, posterior/inferior glides · Manual ER, flexion ROM Treatment/Session Assessment:   
· Response to Treatment:  Patient needs frequent cues to reduce muscle guarding with initial manual therapy but does not report any discomfort today at end range ER or flexion. She has excellent tolerance to AROM/strength progressions and although she complaints of muscle fatigue she has no complaints of pain at end of session. · Compliance with Program/Exercises: Compliant most of the time. · Recommendations/Intent for next treatment session: \"Next visit will focus on advancements to more challenging activities\". Total Treatment Duration: 60 minutes PT Patient Time In/Time Out 
 Time In: 5794 Time Out: 9[address] [facility] Future Appointments Date Time Provider Emily Tuttle 11/27/2018 11:30 AM Devi GAN [facility]  
1/15/2019 10:00 AM [facility]  
1/22/2019 11:00 AM Selma Mcfarland MD [facility]

## 2018-11-27 ENCOUNTER — HOSPITAL ENCOUNTER (OUTPATIENT)
Dept: PHYSICAL THERAPY | Age: 73
Discharge: HOME | End: 2018-11-27
Payer: MEDICARE

## 2018-11-27 PROCEDURE — 97140 MANUAL THERAPY 1/> REGIONS: CPT

## 2018-11-27 PROCEDURE — 97110 THERAPEUTIC EXERCISES: CPT

## 2018-11-27 NOTE — PROGRESS NOTES
Emeterio Conrad : 1945 [place] Secondary: [address] [phone]   [phone] OUTPATIENT PHYSICAL THERAPY:Daily Note 2018 ICD-10: Treatment Diagnosis: Pain in right shoulder (M25.511), Muscle weakness, generalized (M62.81) Precautions/Allergies:  
Levaquin [levofloxacin]; Celebrex [celecoxib]; Cephalexin; Codeine; Dilaudid [hydromorphone]; Pcn [penicillins]; and Prilosec [omeprazole] Fall Risk Score: 0 (? 5 = High Risk) MD Orders: Evaluate and Treat MEDICAL/REFERRING DIAGNOSIS: 
Pain in right shoulder [M25.511] DATE OF ONSET: 2018 REFERRING PHYSICIAN: Arnel Linares MD 
RETURN PHYSICIAN APPOINTMENT: TBD INITIAL ASSESSMENT:  Ms. Frankie Zheng presents with right shoulder pain which began a few months ago without known cause. Her chief complaint is pain but also presents with decreased shoulder mobility and strength which limits her ability to perform ADL and self care tasks without pain or difficulty. She will benefit from skilled therapy to improve her pain, strength and mobility to return to prior level of function. PROBLEM LIST (Impacting functional limitations): 1. Decreased Strength 2. Decreased ADL/Functional Activities 3. Increased Pain 4. Decreased Activity Tolerance 5. Decreased Flexibility/Joint Mobility 6. Decreased [place] with Home Exercise Program INTERVENTIONS PLANNED: 
1. Home Exercise Program (HEP) 2. Manual Therapy 3. Range of Motion (ROM) 4. Therapeutic Exercise/Strengthening TREATMENT PLAN: 
Effective Dates: 2018 TO 2018 (60 days). Frequency/Duration: 2 times a week for 60 Day(s) GOALS: (Goals have been discussed and agreed upon with patient.) Discharge Goals: Time Frame: 60 days 1.  Patient will be independent with home exercise program without assistance from therapist.  
 2. Patient will report Quick DASH score to 20/55 or less to demonstrate improved functional capacity. 3. Patient will demonstrate pain free shoulder overhead flexion to return to normal ADL function without difficulty. 4. Patient will perform full and symmetrical shoulder IR behind her back to demonstrate improved functional mobility. 5. Patient will perform lift, carry and place with #10 or greater to demonstrate improved UE strength. Rehabilitation Potential For Stated Goals: Good The information in this section was collected on 18 (except where otherwise noted). HISTORY:  
History of Present Injury/Illness (Reason for Referral): Laurence Hummel presents with right shoulder pain which has persisted over the past few months. She reports undergoing gal bladder surgery in August where she spent a few days in bed and due to prolonged bed rest, she thinks she aggravated her shoulder while reaching frequently to her bed side table. Her pain is worsened by lifting and reaching across her body. She would like to reduce her pain and improve her strength. Past Medical History/Comorbidities: Ms. Nino Shah  has a past medical history of Anemia, Angioleiomyoma, Arthritis, Bronchiectasis ([address] Bronchiectasis ([address] Calculus of gallbladder without cholecystitis, Cancer ([address] Chronic kidney disease, Crohn's disease ([address] Depression, Dry eye, Fibromyalgia, Hypercholesterolemia, Hypertension, Multiple renal cysts, JASON (obstructive sleep apnea), and Renal cysts, acquired, bilateral.  Ms. Nino Shah  has a past surgical history that includes hx colectomy; hx  section; hx cholecystectomy (2018); and CHOLECYSTECTOMY LAPAROSCOPIC   (N/A, 2018). Social History/Living Environment:  
  Lives in private setting home, no barriers to progress. Prior Level of Function/Work/Activity: 
Retired [employer]:  
      RIGHT Current Medications:   
  
Current Outpatient Medications:   zolpidem (AMBIEN) 5 mg tablet, Take 1 Tab by mouth nightly. Max Daily Amount: 5 mg., Disp: 90 Tab, Rfl: 0 
  famotidine (PEPCID) 20 mg tablet, Take 20 mg by mouth daily. , Disp: , Rfl:  
  lisinopril-hydroCHLOROthiazide (PRINZIDE, ZESTORETIC) 10-12.5 mg per tablet, Take 1 Tab by mouth daily. , Disp: 90 Tab, Rfl: 3 
  rosuvastatin (CRESTOR) 10 mg tablet, Take 1 Tab by mouth nightly., Disp: 90 Tab, Rfl: 3 
  desvenlafaxine succinate (PRISTIQ) 50 mg ER tablet, Take 1 Tab by mouth daily. , Disp: 90 Tab, Rfl: 3 
  DULoxetine (CYMBALTA) 30 mg capsule, Take 1 Cap by mouth daily. , Disp: 90 Cap, Rfl: 3 
  fluticasone (FLONASE) 50 mcg/actuation nasal spray, 2 Sprays by Both Nostrils route daily. , Disp: 3 Bottle, Rfl: 3 
  clorazepate (TRANXENE) 3.75 mg tablet, Take 1 Tab by mouth two (2) times daily as needed. Max Daily Amount: 7.5 mg. (Patient taking differently: Take 3.75 mg by mouth three (3) times daily.), Disp: 180 Tab, Rfl: 1   cholecalciferol, VITAMIN D3, (VITAMIN D3) 5,000 unit tab tablet, Take  by mouth daily. , Disp: , Rfl:  
  cyanocobalamin (VITAMIN B12) 500 mcg tablet, Take 500 mcg by mouth daily. , Disp: , Rfl:  
  acetaminophen (TYLENOL) 500 mg tablet, Take 1,000 mg by mouth daily as needed for Pain., Disp: , Rfl:   
Date Last Reviewed:  11/27/2018 Number of Personal Factors/Comorbidities that affect the Plan of Care: 0: LOW COMPLEXITY EXAMINATION:  
Observation/Orthostatic Postural Assessment:  Patient exhibits mild rounded shoulders and forward head posture. ROM:    
  RIGHT LEFT Shoulder flexion  WNL, painful at end range WNL Shoulder external rotation WNL WNL Shoulder internal rotation WNL WNL Strength:    
  RIGHT  LEFT Shoulder flexion  4/5 5/5 Shoulder ER 4/5, painful 5/5 Shoulder IR 5/5 5/5 Scapular retraction 4/5 4/5 Functional Mobility:    
  RIGHT LEFT Apley IR  L5 L1 Apley ER T2 T2  Horizontal adduction Painful Normal  
 Active impingement  Painful Normal  
     
 Joint Mobility:     
  RIGHT LEFT Glenohumeral Hypomobile Normal  
     
 
 
  
Body Structures Involved: 1. Bones 2. Joints 3. Muscles Body Functions Affected: 1. Sensory/Pain 2. Neuromusculoskeletal 
3. Movement Related Activities and Participation Affected: 1. General Tasks and Demands 2. Mobility 3. Self Care 4. Domestic Life 5. Community, Social and [place] Number of elements (examined above) that affect the Plan of Care: 4+: HIGH COMPLEXITY CLINICAL PRESENTATION:  
Presentation: Stable and uncomplicated: LOW COMPLEXITY CLINICAL DECISION MAKING:  
Outcome Measure: Tool Used: Disabilities of the Arm, Shoulder and Hand (DASH) Questionnaire - Quick Version Score:  Initial: 23/55 (11/1/18) Most Recent: X/55 (Date: -- ) Interpretation of Score: The DASH is designed to measure the activities of daily living in person's with upper extremity dysfunction or pain. Each section is scored on a 1-5 scale, 5 representing the greatest disability. The scores of each section are added together for a total score of 55. Score 11 12-19 20-28 29-37 38-45 46-54 55 Modifier CH CI CJ CK CL CM CN ? Carrying, Moving, and Handling Objects:  
  - CURRENT STATUS: CJ - 20%-39% impaired, limited or restricted  - GOAL STATUS: CI - 1%-19% impaired, limited or restricted  - D/C STATUS:  ---------------To be determined--------------- Medical Necessity:  
· Patient is expected to demonstrate progress in strength and range of motion to increase independence with ADL tasks. Reason for Services/Other Comments: 
· Patient continues to require present interventions due to patient's inability to reach across her body and lift heavy objects without pain.   
Use of outcome tool(s) and clinical judgement create a POC that gives a: Clear prediction of patient's progress: LOW COMPLEXITY  
  
 
 
 
TREATMENT:  
 (In addition to Assessment/Re-Assessment sessions the following treatments were rendered) Pre-treatment Symptoms/Complaints:  Patient says her shoulder has still had some intermittent pain the past week. Pain: Initial:  
Pain Intensity 1: 2 /10 Post Session:  0/10 Therapeutic Exercise: (45 Minutes):  Exercises per grid below to improve mobility and strength. Required moderate visual, verbal and manual cues to promote proper body alignment, promote proper body posture and promote proper body mechanics. Progressed resistance, range, repetitions and complexity of movement as indicated. Date: 
11/27/2018 Activity/Exercise Parameters Patient Education Plan of care, HEP  
AROM progression Supine punch x 20, flexion x 20, Sidelying ER x 20 Scapular retraction 2 x 10 Counter walk back --  
Pendulum 1 min x 2 Foam roll flexion 2 x 10 UE ranger flexion Wall slide x 20 Rows Green x 30 Dynamic isometrics 2 x 10, ER/IR, orange band Bent over row, extension x20 each Manual Therapy (    Soft Tissue Mobilization Duration Duration: 15 Minutes): Manual techniques to facilitate improved motion and decreased pain. (Used abbreviations: MET - muscle energy technique; PNF - proprioceptive neuromuscular facilitation; NMR - neuromuscular re-education; a/p - anterior to posterior; p/a - posterior to anterior) · Glenohumeral joint mobilizations, posterior/inferior glides · Manual ER, flexion ROM Treatment/Session Assessment:   
· Response to Treatment:  Patient able to progress to more resistance activities without complaints of pain. She needs some cueing to maintain form with dynamic isometrics but quickly improves with repetition. · Compliance with Program/Exercises: Compliant most of the time. · Recommendations/Intent for next treatment session: \"Next visit will focus on advancements to more challenging activities\". Total Treatment Duration: 60 minutes PT Patient Time In/Time Out 
 Time In: 1130 Time Out: 1230 Norma Alejandro. [place] Future Appointments Date Time Provider Emily Tuttle 12/6/2018  4:30 PM Sirpercy, Vikki Avina [facility]  
12/12/2018  9:00 AM Leonel Murillo [facility]  
12/21/2018  1:00 PM eLonel Murillo OFF [facility]  
12/27/2018  3:30 PM Leonel Murillo OFF [facility]  
1/8/2019 10:40 AM Marimar Marino MD [address] [place] [address]  
1/15/2019 10:00 AM [facility]  
1/22/2019 11:00 AM Jerod Daily MD [facility]

## 2018-12-06 ENCOUNTER — HOSPITAL ENCOUNTER (OUTPATIENT)
Dept: PHYSICAL THERAPY | Age: 73
Discharge: HOME | End: 2018-12-06
Payer: MEDICARE

## 2018-12-06 PROCEDURE — 97110 THERAPEUTIC EXERCISES: CPT

## 2018-12-06 PROCEDURE — 97140 MANUAL THERAPY 1/> REGIONS: CPT

## 2018-12-06 NOTE — PROGRESS NOTES
Coty Deleon : 1945 [place] Secondary: [address] [place] [id number] [address], [address] [phone]   [phone] OUTPATIENT PHYSICAL THERAPY:Daily Note 2018 ICD-10: Treatment Diagnosis: Pain in right shoulder (M25.511), Muscle weakness, generalized (M62.81) Precautions/Allergies:  
Levaquin [levofloxacin]; Celebrex [celecoxib]; Cephalexin; Codeine; Dilaudid [hydromorphone]; Pcn [penicillins]; and Prilosec [omeprazole] Fall Risk Score: 0 (? 5 = High Risk) MD Orders: Evaluate and Treat MEDICAL/REFERRING DIAGNOSIS: 
Pain in right shoulder [M25.511] DATE OF ONSET: 2018 REFERRING PHYSICIAN: Jelena Linares MD 
RETURN PHYSICIAN APPOINTMENT: TBD INITIAL ASSESSMENT:  Ms. Cheikh Morton presents with right shoulder pain which began a few months ago without known cause. Her chief complaint is pain but also presents with decreased shoulder mobility and strength which limits her ability to perform ADL and self care tasks without pain or difficulty. She will benefit from skilled therapy to improve her pain, strength and mobility to return to prior level of function. PROBLEM LIST (Impacting functional limitations): 1. Decreased Strength 2. Decreased ADL/Functional Activities 3. Increased Pain 4. Decreased Activity Tolerance 5. Decreased Flexibility/Joint Mobility 6. Decreased [place] with Home Exercise Program INTERVENTIONS PLANNED: 
1. Home Exercise Program (HEP) 2. Manual Therapy 3. Range of Motion (ROM) 4. Therapeutic Exercise/Strengthening TREATMENT PLAN: 
Effective Dates: 2018 TO 2018 (60 days). Frequency/Duration: 2 times a week for 60 Day(s) GOALS: (Goals have been discussed and agreed upon with patient.) Discharge Goals: Time Frame: 60 days 1.  Patient will be independent with home exercise program without assistance from therapist.  
 2. Patient will report Quick DASH score to 20/55 or less to demonstrate improved functional capacity. 3. Patient will demonstrate pain free shoulder overhead flexion to return to normal ADL function without difficulty. 4. Patient will perform full and symmetrical shoulder IR behind her back to demonstrate improved functional mobility. 5. Patient will perform lift, carry and place with #10 or greater to demonstrate improved UE strength. Rehabilitation Potential For Stated Goals: Good The information in this section was collected on 18 (except where otherwise noted). HISTORY:  
History of Present Injury/Illness (Reason for Referral): Dane Grullon presents with right shoulder pain which has persisted over the past few months. She reports undergoing gal bladder surgery in August where she spent a few days in bed and due to prolonged bed rest, she thinks she aggravated her shoulder while reaching frequently to her bed side table. Her pain is worsened by lifting and reaching across her body. She would like to reduce her pain and improve her strength. Past Medical History/Comorbidities: Ms. Alonso Cunningham  has a past medical history of Anemia, Angioleiomyoma, Arthritis, Bronchiectasis ([address] Bronchiectasis ([address] Calculus of gallbladder without cholecystitis, Cancer ([address] Chronic kidney disease, Crohn's disease ([address] Depression, Dry eye, Fibromyalgia, Hypercholesterolemia, Hypertension, Multiple renal cysts, JASON (obstructive sleep apnea), and Renal cysts, acquired, bilateral.  Ms. Alonso Cunningham  has a past surgical history that includes hx colectomy; hx  section; hx cholecystectomy (2018); and CHOLECYSTECTOMY LAPAROSCOPIC   (N/A, 2018). Social History/Living Environment:  
  Lives in private setting home, no barriers to progress. Prior Level of Function/Work/Activity: 
Retired [employer]:  
      RIGHT Current Medications:   
  
Current Outpatient Medications:   zolpidem (AMBIEN) 5 mg tablet, Take 1 Tab by mouth nightly. Max Daily Amount: 5 mg., Disp: 90 Tab, Rfl: 0 
  famotidine (PEPCID) 20 mg tablet, Take 20 mg by mouth daily. , Disp: , Rfl:  
  lisinopril-hydroCHLOROthiazide (PRINZIDE, ZESTORETIC) 10-12.5 mg per tablet, Take 1 Tab by mouth daily. , Disp: 90 Tab, Rfl: 3 
  rosuvastatin (CRESTOR) 10 mg tablet, Take 1 Tab by mouth nightly., Disp: 90 Tab, Rfl: 3 
  desvenlafaxine succinate (PRISTIQ) 50 mg ER tablet, Take 1 Tab by mouth daily. , Disp: 90 Tab, Rfl: 3 
  DULoxetine (CYMBALTA) 30 mg capsule, Take 1 Cap by mouth daily. , Disp: 90 Cap, Rfl: 3 
  fluticasone (FLONASE) 50 mcg/actuation nasal spray, 2 Sprays by Both Nostrils route daily. , Disp: 3 Bottle, Rfl: 3 
  clorazepate (TRANXENE) 3.75 mg tablet, Take 1 Tab by mouth two (2) times daily as needed. Max Daily Amount: 7.5 mg. (Patient taking differently: Take 3.75 mg by mouth three (3) times daily.), Disp: 180 Tab, Rfl: 1   cholecalciferol, VITAMIN D3, (VITAMIN D3) 5,000 unit tab tablet, Take  by mouth daily. , Disp: , Rfl:  
  cyanocobalamin (VITAMIN B12) 500 mcg tablet, Take 500 mcg by mouth daily. , Disp: , Rfl:  
  acetaminophen (TYLENOL) 500 mg tablet, Take 1,000 mg by mouth daily as needed for Pain., Disp: , Rfl:   
Date Last Reviewed:  12/6/2018 Number of Personal Factors/Comorbidities that affect the Plan of Care: 0: LOW COMPLEXITY EXAMINATION:  
Observation/Orthostatic Postural Assessment:  Patient exhibits mild rounded shoulders and forward head posture. ROM:    
  RIGHT LEFT Shoulder flexion  WNL, painful at end range WNL Shoulder external rotation WNL WNL Shoulder internal rotation WNL WNL Strength:    
  RIGHT  LEFT Shoulder flexion  4/5 5/5 Shoulder ER 4/5, painful 5/5 Shoulder IR 5/5 5/5 Scapular retraction 4/5 4/5 Functional Mobility:    
  RIGHT LEFT Apley IR  L5 L1 Apley ER T2 T2  Horizontal adduction Painful Normal  
 Active impingement  Painful Normal  
     
 Joint Mobility:     
  RIGHT LEFT Glenohumeral Hypomobile Normal  
     
 
 
  
Body Structures Involved: 1. Bones 2. Joints 3. Muscles Body Functions Affected: 1. Sensory/Pain 2. Neuromusculoskeletal 
3. Movement Related Activities and Participation Affected: 1. General Tasks and Demands 2. Mobility 3. Self Care 4. Domestic Life 5. Community, Social and [place] Number of elements (examined above) that affect the Plan of Care: 4+: HIGH COMPLEXITY CLINICAL PRESENTATION:  
Presentation: Stable and uncomplicated: LOW COMPLEXITY CLINICAL DECISION MAKING:  
Outcome Measure: Tool Used: Disabilities of the Arm, Shoulder and Hand (DASH) Questionnaire - Quick Version Score:  Initial: 23/55 (11/1/18) Most Recent: X/55 (Date: -- ) Interpretation of Score: The DASH is designed to measure the activities of daily living in person's with upper extremity dysfunction or pain. Each section is scored on a 1-5 scale, 5 representing the greatest disability. The scores of each section are added together for a total score of 55. Score 11 12-19 20-28 29-37 38-45 46-54 55 Modifier CH CI CJ CK CL CM CN ? Carrying, Moving, and Handling Objects:  
  - CURRENT STATUS: CJ - 20%-39% impaired, limited or restricted  - GOAL STATUS: CI - 1%-19% impaired, limited or restricted  - D/C STATUS:  ---------------To be determined--------------- Medical Necessity:  
· Patient is expected to demonstrate progress in strength and range of motion to increase independence with ADL tasks. Reason for Services/Other Comments: 
· Patient continues to require present interventions due to patient's inability to reach across her body and lift heavy objects without pain.   
Use of outcome tool(s) and clinical judgement create a POC that gives a: Clear prediction of patient's progress: LOW COMPLEXITY  
  
 
 
 
TREATMENT:  
 (In addition to Assessment/Re-Assessment sessions the following treatments were rendered) Pre-treatment Symptoms/Complaints:  Patient says her shoulder is feeling better but still has some sharp pains when reaching out and to the side. Pain: Initial:  
Pain Intensity 1: 2 /10 Post Session:  0/10 Therapeutic Exercise: (40 Minutes):  Exercises per grid below to improve mobility and strength. Required moderate visual, verbal and manual cues to promote proper body alignment, promote proper body posture and promote proper body mechanics. Progressed resistance, range, repetitions and complexity of movement as indicated. Date: 
12/6/2018 Activity/Exercise Parameters Patient Education Plan of care, HEP  
AROM progression Supine punch x 20, flexion x 20, Sidelying ER x 20 Scapular retraction 2 x 10 Counter walk back --  
Pendulum 1 min x 2 Foam roll flexion --  
UE ranger flexion x20 Rows Green x 30 Dynamic isometrics 2 x 10, ER/IR, orange band Bent over row, extension, abduction x20 each Flexion to abduction eccentric x20 Manual Therapy (    Soft Tissue Mobilization Duration Duration: 15 Minutes): Manual techniques to facilitate improved motion and decreased pain. (Used abbreviations: MET - muscle energy technique; PNF - proprioceptive neuromuscular facilitation; NMR - neuromuscular re-education; a/p - anterior to posterior; p/a - posterior to anterior) · Glenohumeral joint mobilizations, posterior/inferior glides · Manual ER, flexion ROM Treatment/Session Assessment:   
· Response to Treatment:  Patient has better tolerance to AROM abduction tasks today but occasionally still needs cues to reduce shrugging with mid-range movements. · Compliance with Program/Exercises: Compliant most of the time. · Recommendations/Intent for next treatment session: \"Next visit will focus on advancements to more challenging activities\". Total Treatment Duration: 55 minutes PT Patient Time In/Time Out Time In: 1630 Time Out: [address]. [place] Future Appointments Date Time Provider Emily Tuttle 12/12/2018  9:00 AM Beatrice Vazquez Bala [facility]  
12/21/2018  1:00 PM Norm Jacqueline [facility]  
12/27/2018  3:30 PM Norm [facility]  
1/8/2019 10:40 AM Lanette Holter, MD [address] [place] [address]  
1/15/2019 10:00 AM [facility]  
1/22/2019 11:00 AM Malgorzata Breaux MD [facility]

## 2018-12-12 ENCOUNTER — HOSPITAL ENCOUNTER (OUTPATIENT)
Dept: PHYSICAL THERAPY | Age: 73
Discharge: HOME | End: 2018-12-12
Payer: MEDICARE

## 2018-12-12 PROCEDURE — 97110 THERAPEUTIC EXERCISES: CPT

## 2018-12-12 PROCEDURE — 97140 MANUAL THERAPY 1/> REGIONS: CPT

## 2018-12-12 NOTE — PROGRESS NOTES
Andrea Mcdaniel  : 1945  [place]  Secondary: [address] at [address]  [address]  [phone]   [id number] [id number]          OUTPATIENT PHYSICAL THERAPY:Daily Note 2018   ICD-10: Treatment Diagnosis: Pain in right shoulder (M25.511), Muscle weakness, generalized (M62.81)  Precautions/Allergies:   Levaquin [levofloxacin]; Celebrex [celecoxib]; Cephalexin; Codeine; Dilaudid [hydromorphone]; Pcn [penicillins]; and Prilosec [omeprazole]   Fall Risk Score: 0 (? 5 = High Risk)  MD Orders: Evaluate and Treat MEDICAL/REFERRING DIAGNOSIS:  Pain in right shoulder [M25.511]   DATE OF ONSET: 2018  REFERRING PHYSICIAN: Sharif Arenas MD  RETURN PHYSICIAN APPOINTMENT: TBD     INITIAL ASSESSMENT:  Ms. Franciso Dubin presents with right shoulder pain which began a few months ago without known cause. Her chief complaint is pain but also presents with decreased shoulder mobility and strength which limits her ability to perform ADL and self care tasks without pain or difficulty. She will benefit from skilled therapy to improve her pain, strength and mobility to return to prior level of function. PROBLEM LIST (Impacting functional limitations):  1. Decreased Strength  2. Decreased ADL/Functional Activities  3. Increased Pain  4. Decreased Activity Tolerance  5. Decreased Flexibility/Joint Mobility  6. Decreased [place] with Home Exercise Program INTERVENTIONS PLANNED:  1. Home Exercise Program (HEP)  2. Manual Therapy  3. Range of Motion (ROM)  4. Therapeutic Exercise/Strengthening   TREATMENT PLAN:  Effective Dates: 2018 TO 2018 (60 days). Frequency/Duration: 2 times a week for 60 Day(s)  GOALS: (Goals have been discussed and agreed upon with patient.)  Discharge Goals: Time Frame: 60 days  1.  Patient will be independent with home exercise program without assistance from therapist. 2. Patient will report Quick DASH score to 20/55 or less to demonstrate improved functional capacity. 3. Patient will demonstrate pain free shoulder overhead flexion to return to normal ADL function without difficulty. 4. Patient will perform full and symmetrical shoulder IR behind her back to demonstrate improved functional mobility. 5. Patient will perform lift, carry and place with #10 or greater to demonstrate improved UE strength. Rehabilitation Potential For Stated Goals: Good              The information in this section was collected on 18 (except where otherwise noted). HISTORY:   History of Present Injury/Illness (Reason for Referral):  Chuy Ott presents with right shoulder pain which has persisted over the past few months. She reports undergoing gal bladder surgery in August where she spent a few days in bed and due to prolonged bed rest, she thinks she aggravated her shoulder while reaching frequently to her bed side table. Her pain is worsened by lifting and reaching across her body. She would like to reduce her pain and improve her strength. Past Medical History/Comorbidities:   Ms. Matthew Young  has a past medical history of Anemia, Angioleiomyoma, Arthritis, Bronchiectasis ([address] Bronchiectasis ([address] Calculus of gallbladder without cholecystitis, Cancer ([address] Chronic kidney disease, Crohn's disease ([address] Depression, Dry eye, Fibromyalgia, Hypercholesterolemia, Hypertension, Multiple renal cysts, JASON (obstructive sleep apnea), and Renal cysts, acquired, bilateral.  Ms. Matthew Young  has a past surgical history that includes hx colectomy; hx  section; hx cholecystectomy (2018); and CHOLECYSTECTOMY LAPAROSCOPIC   (N/A, 2018). Social History/Living Environment:     Lives in private setting home, no barriers to progress.    Prior Level of Function/Work/Activity:  Retired  Dominant Side:         RIGHT  Current Medications:       Current Outpatient Medications:     zolpidem (AMBIEN) 5 mg tablet, Take 1 Tab by mouth nightly. Max Daily Amount: 5 mg., Disp: 90 Tab, Rfl: 0    famotidine (PEPCID) 20 mg tablet, Take 20 mg by mouth daily. , Disp: , Rfl:     lisinopril-hydroCHLOROthiazide (PRINZIDE, ZESTORETIC) 10-12.5 mg per tablet, Take 1 Tab by mouth daily. , Disp: 90 Tab, Rfl: 3    rosuvastatin (CRESTOR) 10 mg tablet, Take 1 Tab by mouth nightly., Disp: 90 Tab, Rfl: 3    desvenlafaxine succinate (PRISTIQ) 50 mg ER tablet, Take 1 Tab by mouth daily. , Disp: 90 Tab, Rfl: 3    DULoxetine (CYMBALTA) 30 mg capsule, Take 1 Cap by mouth daily. , Disp: 90 Cap, Rfl: 3    fluticasone (FLONASE) 50 mcg/actuation nasal spray, 2 Sprays by Both Nostrils route daily. , Disp: 3 Bottle, Rfl: 3    clorazepate (TRANXENE) 3.75 mg tablet, Take 1 Tab by mouth two (2) times daily as needed. Max Daily Amount: 7.5 mg. (Patient taking differently: Take 3.75 mg by mouth three (3) times daily.), Disp: 180 Tab, Rfl: 1    cholecalciferol, VITAMIN D3, (VITAMIN D3) 5,000 unit tab tablet, Take  by mouth daily. , Disp: , Rfl:     cyanocobalamin (VITAMIN B12) 500 mcg tablet, Take 500 mcg by mouth daily. , Disp: , Rfl:     acetaminophen (TYLENOL) 500 mg tablet, Take 1,000 mg by mouth daily as needed for Pain., Disp: , Rfl:    Date Last Reviewed:  12/12/2018     Number of Personal Factors/Comorbidities that affect the Plan of Care: 0: LOW COMPLEXITY   EXAMINATION:   Observation/Orthostatic Postural Assessment:  Patient exhibits mild rounded shoulders and forward head posture.             ROM:       RIGHT LEFT    Shoulder flexion  WNL, painful at end range WNL    Shoulder external rotation WNL WNL    Shoulder internal rotation WNL WNL          Strength:       RIGHT  LEFT     Shoulder flexion  4/5 5/5    Shoulder ER 4/5, painful 5/5    Shoulder IR 5/5 5/5    Scapular retraction 4/5 4/5                Functional Mobility:       RIGHT LEFT    Apley IR  L5 L1    Apley ER T2 T2    Horizontal adduction Painful Normal    Active impingement  Painful Normal          Joint Mobility:        RIGHT LEFT     Glenohumeral Hypomobile Normal                Body Structures Involved:  1. Bones  2. Joints  3. Muscles Body Functions Affected:  1. Sensory/Pain  2. Neuromusculoskeletal  3. Movement Related Activities and Participation Affected:  1. General Tasks and Demands  2. Mobility  3. Self Care  4. Domestic Life  5. Community, Social and [place]   Number of elements (examined above) that affect the Plan of Care: 4+: HIGH COMPLEXITY   CLINICAL PRESENTATION:   Presentation: Stable and uncomplicated: LOW COMPLEXITY   CLINICAL DECISION MAKING:   Outcome Measure: Tool Used: Disabilities of the Arm, Shoulder and Hand (DASH) Questionnaire - Quick Version  Score:  Initial: 23/55 (11/1/18) Most Recent: X/55 (Date: -- )   Interpretation of Score: The DASH is designed to measure the activities of daily living in person's with upper extremity dysfunction or pain. Each section is scored on a 1-5 scale, 5 representing the greatest disability. The scores of each section are added together for a total score of 55. Score 11 12-19 20-28 29-37 38-45 46-54 55   Modifier CH CI CJ CK CL CM CN     ? Carrying, Moving, and Handling Objects:     - CURRENT STATUS: CJ - 20%-39% impaired, limited or restricted    - GOAL STATUS: CI - 1%-19% impaired, limited or restricted    - D/C STATUS:  ---------------To be determined---------------      Medical Necessity:   · Patient is expected to demonstrate progress in strength and range of motion to increase independence with ADL tasks. Reason for Services/Other Comments:  · Patient continues to require present interventions due to patient's inability to reach across her body and lift heavy objects without pain.    Use of outcome tool(s) and clinical judgement create a POC that gives a: Clear prediction of patient's progress: LOW COMPLEXITY            TREATMENT:   (In addition to Assessment/Re-Assessment sessions the following treatments were rendered)  Pre-treatment Symptoms/Complaints:  Patient says her arm feels better but has had occasional sharp pains the past few days. Pain: Initial:   Pain Intensity 1: 2 /10 Post Session:  0/10     Therapeutic Exercise: (40 Minutes):  Exercises per grid below to improve mobility and strength. Required moderate visual, verbal and manual cues to promote proper body alignment, promote proper body posture and promote proper body mechanics. Progressed resistance, range, repetitions and complexity of movement as indicated. Date:  12/12/2018     Activity/Exercise Parameters   Patient Education Plan of care, HEP   AROM progression Supine punch x 20, flexion x 20, Sidelying ER x 20   Scapular retraction 2 x 10   Counter walk back --   Pendulum 1 min x 2   Foam roll flexion --   UE ranger flexion Wall slide, x15   Rows Green x 30   Dynamic isometrics 2 x 10, ER/IR, orange band   Bent over row, extension, abduction x20 each   Flexion to abduction eccentric x20     Manual Therapy (    Soft Tissue Mobilization Duration  Duration: 20 Minutes): Manual techniques to facilitate improved motion and decreased pain. (Used abbreviations: MET - muscle energy technique; PNF - proprioceptive neuromuscular facilitation; NMR - neuromuscular re-education; a/p - anterior to posterior; p/a - posterior to anterior)   · Glenohumeral joint mobilizations, posterior/inferior glides  · Manual ER, flexion ROM      Treatment/Session Assessment:    · Response to Treatment:  Patient continues to display improving endurance and pain free ROM. · Compliance with Program/Exercises: Compliant most of the time. · Recommendations/Intent for next treatment session: \"Next visit will focus on advancements to more challenging activities\". Total Treatment Duration: 60 minutes  PT Patient Time In/Time Out  Time In: 0900  Time Out: 1000    Anibal Vazquez    Future Appointments   Date Time Provider Emily Tuttle 12/21/2018  1:00 PM Gaby Rebolledo [facility]   12/27/2018  3:30 PM Alexeirené Due [facility]   1/8/2019 10:40 AM Elsa Smith MD [address] [place] [address]   1/15/2019 10:00 AM [facility]   1/22/2019 11:00 AM Dusty Gama MD [facility]

## 2018-12-21 ENCOUNTER — HOSPITAL ENCOUNTER (OUTPATIENT)
Dept: PHYSICAL THERAPY | Age: 73
Discharge: HOME | End: 2018-12-21
Payer: MEDICARE

## 2018-12-21 PROCEDURE — 97110 THERAPEUTIC EXERCISES: CPT

## 2018-12-21 PROCEDURE — 97140 MANUAL THERAPY 1/> REGIONS: CPT

## 2018-12-21 NOTE — PROGRESS NOTES
Mary Mendoza  : 1945  [place]  Secondary: [address] at [address]  [address]  [phone]   [phone]          OUTPATIENT PHYSICAL THERAPY:Daily Note 2018   ICD-10: Treatment Diagnosis: Pain in right shoulder (M25.511), Muscle weakness, generalized (M62.81)  Precautions/Allergies:   Levaquin [levofloxacin]; Celebrex [celecoxib]; Cephalexin; Codeine; Dilaudid [hydromorphone]; Pcn [penicillins]; and Prilosec [omeprazole]   Fall Risk Score: 0 (? 5 = High Risk)  MD Orders: Evaluate and Treat MEDICAL/REFERRING DIAGNOSIS:  Pain in right shoulder [M25.511]   DATE OF ONSET: 2018  REFERRING PHYSICIAN: Velia Mendoza MD  RETURN PHYSICIAN APPOINTMENT: TBD     INITIAL ASSESSMENT:  Ms. Matthew Young presents with right shoulder pain which began a few months ago without known cause. Her chief complaint is pain but also presents with decreased shoulder mobility and strength which limits her ability to perform ADL and self care tasks without pain or difficulty. She will benefit from skilled therapy to improve her pain, strength and mobility to return to prior level of function. PROBLEM LIST (Impacting functional limitations):  1. Decreased Strength  2. Decreased ADL/Functional Activities  3. Increased Pain  4. Decreased Activity Tolerance  5. Decreased Flexibility/Joint Mobility  6. Decreased [place] with Home Exercise Program INTERVENTIONS PLANNED:  1. Home Exercise Program (HEP)  2. Manual Therapy  3. Range of Motion (ROM)  4. Therapeutic Exercise/Strengthening   TREATMENT PLAN:  Effective Dates: 2018 TO 2018 (60 days). Frequency/Duration: 2 times a week for 60 Day(s)  GOALS: (Goals have been discussed and agreed upon with patient.)  Discharge Goals: Time Frame: 60 days  1.  Patient will be independent with home exercise program without assistance from therapist. 2. Patient will report Quick DASH score to 20/55 or less to demonstrate improved functional capacity. 3. Patient will demonstrate pain free shoulder overhead flexion to return to normal ADL function without difficulty. 4. Patient will perform full and symmetrical shoulder IR behind her back to demonstrate improved functional mobility. 5. Patient will perform lift, carry and place with #10 or greater to demonstrate improved UE strength. Rehabilitation Potential For Stated Goals: Good              The information in this section was collected on 18 (except where otherwise noted). HISTORY:   History of Present Injury/Illness (Reason for Referral):  Dexter Saleh presents with right shoulder pain which has persisted over the past few months. She reports undergoing gal bladder surgery in August where she spent a few days in bed and due to prolonged bed rest, she thinks she aggravated her shoulder while reaching frequently to her bed side table. Her pain is worsened by lifting and reaching across her body. She would like to reduce her pain and improve her strength. Past Medical History/Comorbidities:   Ms. Yamilka Gilbert  has a past medical history of Anemia, Angioleiomyoma, Arthritis, Bronchiectasis ([address] Bronchiectasis ([address] Calculus of gallbladder without cholecystitis, Cancer ([address] Chronic kidney disease, Crohn's disease ([address] Depression, Dry eye, Fibromyalgia, Hypercholesterolemia, Hypertension, Multiple renal cysts, JASON (obstructive sleep apnea), and Renal cysts, acquired, bilateral.  Ms. Yamilka Gilbert  has a past surgical history that includes hx colectomy; hx  section; hx cholecystectomy (2018); and CHOLECYSTECTOMY LAPAROSCOPIC   (N/A, 2018). Social History/Living Environment:     Lives in private setting home, no barriers to progress.    Prior Level of Function/Work/Activity:  Retired  Dominant Side:         RIGHT  Current Medications:       Current Outpatient Medications:     zolpidem (AMBIEN) 5 mg tablet, Take 1 Tab by mouth nightly. Max Daily Amount: 5 mg., Disp: 90 Tab, Rfl: 0    famotidine (PEPCID) 20 mg tablet, Take 20 mg by mouth daily. , Disp: , Rfl:     lisinopril-hydroCHLOROthiazide (PRINZIDE, ZESTORETIC) 10-12.5 mg per tablet, Take 1 Tab by mouth daily. , Disp: 90 Tab, Rfl: 3    rosuvastatin (CRESTOR) 10 mg tablet, Take 1 Tab by mouth nightly., Disp: 90 Tab, Rfl: 3    desvenlafaxine succinate (PRISTIQ) 50 mg ER tablet, Take 1 Tab by mouth daily. , Disp: 90 Tab, Rfl: 3    DULoxetine (CYMBALTA) 30 mg capsule, Take 1 Cap by mouth daily. , Disp: 90 Cap, Rfl: 3    fluticasone (FLONASE) 50 mcg/actuation nasal spray, 2 Sprays by Both Nostrils route daily. , Disp: 3 Bottle, Rfl: 3    clorazepate (TRANXENE) 3.75 mg tablet, Take 1 Tab by mouth two (2) times daily as needed. Max Daily Amount: 7.5 mg. (Patient taking differently: Take 3.75 mg by mouth three (3) times daily.), Disp: 180 Tab, Rfl: 1    cholecalciferol, VITAMIN D3, (VITAMIN D3) 5,000 unit tab tablet, Take  by mouth daily. , Disp: , Rfl:     cyanocobalamin (VITAMIN B12) 500 mcg tablet, Take 500 mcg by mouth daily. , Disp: , Rfl:     acetaminophen (TYLENOL) 500 mg tablet, Take 1,000 mg by mouth daily as needed for Pain., Disp: , Rfl:    Date Last Reviewed:  12/21/2018     Number of Personal Factors/Comorbidities that affect the Plan of Care: 0: LOW COMPLEXITY   EXAMINATION:   Observation/Orthostatic Postural Assessment:  Patient exhibits mild rounded shoulders and forward head posture.             ROM:       RIGHT LEFT    Shoulder flexion  WNL, painful at end range WNL    Shoulder external rotation WNL WNL    Shoulder internal rotation WNL WNL          Strength:       RIGHT  LEFT     Shoulder flexion  4/5 5/5    Shoulder ER 4/5, painful 5/5    Shoulder IR 5/5 5/5    Scapular retraction 4/5 4/5                Functional Mobility:       RIGHT LEFT    Apley IR  L5 L1    Apley ER T2 T2    Horizontal adduction Painful Normal    Active impingement  Painful Normal          Joint Mobility:        RIGHT LEFT     Glenohumeral Hypomobile Normal                Body Structures Involved:  1. Bones  2. Joints  3. Muscles Body Functions Affected:  1. Sensory/Pain  2. Neuromusculoskeletal  3. Movement Related Activities and Participation Affected:  1. General Tasks and Demands  2. Mobility  3. Self Care  4. Domestic Life  5. Community, Social and [place]   Number of elements (examined above) that affect the Plan of Care: 4+: HIGH COMPLEXITY   CLINICAL PRESENTATION:   Presentation: Stable and uncomplicated: LOW COMPLEXITY   CLINICAL DECISION MAKING:   Outcome Measure: Tool Used: Disabilities of the Arm, Shoulder and Hand (DASH) Questionnaire - Quick Version  Score:  Initial: 23/55 (11/1/18) Most Recent: X/55 (Date: -- )   Interpretation of Score: The DASH is designed to measure the activities of daily living in person's with upper extremity dysfunction or pain. Each section is scored on a 1-5 scale, 5 representing the greatest disability. The scores of each section are added together for a total score of 55. Score 11 12-19 20-28 29-37 38-45 46-54 55   Modifier CH CI CJ CK CL CM CN     ? Carrying, Moving, and Handling Objects:     - CURRENT STATUS: CJ - 20%-39% impaired, limited or restricted    - GOAL STATUS: CI - 1%-19% impaired, limited or restricted    - D/C STATUS:  ---------------To be determined---------------      Medical Necessity:   · Patient is expected to demonstrate progress in strength and range of motion to increase independence with ADL tasks. Reason for Services/Other Comments:  · Patient continues to require present interventions due to patient's inability to reach across her body and lift heavy objects without pain.    Use of outcome tool(s) and clinical judgement create a POC that gives a: Clear prediction of patient's progress: LOW COMPLEXITY            TREATMENT:   (In addition to Assessment/Re-Assessment sessions the following treatments were rendered)  Pre-treatment Symptoms/Complaints:  Patient says her pain has continued to be more random the past few days. Pain: Initial:   Pain Intensity 1: 2 /10 Post Session:  0/10     Therapeutic Exercise: (45 Minutes):  Exercises per grid below to improve mobility and strength. Required moderate visual, verbal and manual cues to promote proper body alignment, promote proper body posture and promote proper body mechanics. Progressed resistance, range, repetitions and complexity of movement as indicated. Date:  12/21/2018     Activity/Exercise Parameters   Patient Education Update HEP   AROM progression Supine punch x 20, flexion x 20, Sidelying ER x 20, Abduction x 20   Scapular retraction 2 x 10   Counter walk back x10   Pendulum --   Foam roll flexion --   UE ranger flexion Wall slide, x15   Rows Green x 30   Dynamic isometrics --   Bent over row, extension, abduction x20 each, with #2   Flexion to abduction eccentric x20   Ball circles On wall, cw, ccw, ABCs   Ball PNF D1 Yellow ball, 2 x 10     Manual Therapy (    Soft Tissue Mobilization Duration  Duration: 15 Minutes): Manual techniques to facilitate improved motion and decreased pain. (Used abbreviations: MET - muscle energy technique; PNF - proprioceptive neuromuscular facilitation; NMR - neuromuscular re-education; a/p - anterior to posterior; p/a - posterior to anterior)   · Glenohumeral joint mobilizations, posterior/inferior glides  · Manual ER, flexion ROM      Treatment/Session Assessment:    · Response to Treatment:  Patient displays pain free motion overhead and to side today and although she fatigues with increasing workload no complaints of shoulder pain noted during and after session. · Compliance with Program/Exercises: Compliant most of the time. · Recommendations/Intent for next treatment session: \"Next visit will focus on advancements to more challenging activities\".   Total Treatment Duration: 60 minutes  PT Patient Time In/Time Out  Time In: 1300  Time Out: [address]  Sir    Future Appointments   Date Time Provider Emily Tuttle   1/3/2019  1:30 PM [facility]   1/8/2019 10:40 AM La Sahni MD [address] [place] [address]   1/15/2019 10:00 AM [facility]   1/22/2019 11:00 AM Yanick Kapoor MD [facility]

## 2018-12-27 ENCOUNTER — APPOINTMENT (OUTPATIENT)
Dept: PHYSICAL THERAPY | Age: 73
End: 2018-12-27
Payer: MEDICARE

## 2019-01-03 ENCOUNTER — HOSPITAL ENCOUNTER (OUTPATIENT)
Dept: PHYSICAL THERAPY | Age: 74
Discharge: HOME | End: 2019-01-03
Payer: MEDICARE

## 2019-01-03 PROCEDURE — 97110 THERAPEUTIC EXERCISES: CPT

## 2019-01-03 NOTE — THERAPY RECERTIFICATION
Madhav Fresh : 1945 [place] Secondary: [address] [facility] at [facility] [address], [address] [phone]   [phone] OUTPATIENT PHYSICAL THERAPY:Daily Note and Recertification [id number] ICD-10: Treatment Diagnosis: Pain in right shoulder (M25.511), Muscle weakness, generalized (M62.81) Precautions/Allergies:  
Levaquin [levofloxacin]; Celebrex [celecoxib]; Cephalexin; Codeine; Dilaudid [hydromorphone]; Pcn [penicillins]; and Prilosec [omeprazole] Fall Risk Score: 0 (? 5 = High Risk) MD Orders: Evaluate and Treat MEDICAL/REFERRING DIAGNOSIS: 
Pain in right shoulder [M25.511] DATE OF ONSET: 2018 REFERRING PHYSICIAN: Key Anderson MD 
RETURN PHYSICIAN APPOINTMENT: TBD INITIAL ASSESSMENT:  Ms. Alex Almeida presents with right shoulder pain which began a few months ago without known cause. Her chief complaint is pain but also presents with decreased shoulder mobility and strength which limits her ability to perform ADL and self care tasks without pain or difficulty. She will benefit from skilled therapy to improve her pain, strength and mobility to return to prior level of function. PROBLEM LIST (Impacting functional limitations): 1. Decreased Strength 2. Decreased ADL/Functional Activities 3. Increased Pain 4. Decreased Activity Tolerance 5. Decreased Flexibility/Joint Mobility 6. Decreased [place] with Home Exercise Program INTERVENTIONS PLANNED: 
1. Home Exercise Program (HEP) 2. Manual Therapy 3. Range of Motion (ROM) 4. Therapeutic Exercise/Strengthening TREATMENT PLAN: 
Effective Dates: 1/3/19 TO 19 (60 days). Frequency/Duration: 1 time a week for 6 weeks. Frequency may vary due to patient travel schedule over next few months. GOALS: (Goals have been discussed and agreed upon with patient.) Discharge Goals: Time Frame: 6 weeks 1. Patient will be independent with home exercise program without assistance from therapist.  
2. Patient will report Quick DASH score to 20/55 or less to demonstrate improved functional capacity. 3. Patient will demonstrate pain free shoulder overhead flexion to return to normal ADL function without difficulty. 4. Patient will perform full and symmetrical shoulder IR behind her back to demonstrate improved functional mobility. 5. Patient will perform lift, carry and place with #10 or greater to demonstrate improved UE strength. Rehabilitation Potential For Stated Goals: Good The information in this section was collected on 18 (except where otherwise noted). HISTORY:  
History of Present Injury/Illness (Reason for Referral): Wellington Leach presents with right shoulder pain which has persisted over the past few months. She reports undergoing gal bladder surgery in August where she spent a few days in bed and due to prolonged bed rest, she thinks she aggravated her shoulder while reaching frequently to her bed side table. Her pain is worsened by lifting and reaching across her body. She would like to reduce her pain and improve her strength. Past Medical History/Comorbidities: Ms. Alicia Sears  has a past medical history of Anemia, Angioleiomyoma, Arthritis, Bronchiectasis ([address] Bronchiectasis ([address] Calculus of gallbladder without cholecystitis, Cancer ([address] Chronic kidney disease, Crohn's disease ([address] Depression, Dry eye, Fibromyalgia, Hypercholesterolemia, Hypertension, Multiple renal cysts, JASON (obstructive sleep apnea), and Renal cysts, acquired, bilateral.  Ms. Alicia Sears  has a past surgical history that includes hx colectomy; hx  section; hx cholecystectomy (2018); and CHOLECYSTECTOMY LAPAROSCOPIC   (N/A, 2018). Social History/Living Environment:  
  Lives in private setting home, no barriers to progress. Prior Level of Function/Work/Activity: 
Retired Dominant Side:  
      RIGHT Current Medications:   
  
Current Outpatient Medications:  
  zolpidem (AMBIEN) 5 mg tablet, Take 1 Tab by mouth nightly. Max Daily Amount: 5 mg., Disp: 90 Tab, Rfl: 0 
  famotidine (PEPCID) 20 mg tablet, Take 20 mg by mouth daily. , Disp: , Rfl:  
  lisinopril-hydroCHLOROthiazide (PRINZIDE, ZESTORETIC) 10-12.5 mg per tablet, Take 1 Tab by mouth daily. , Disp: 90 Tab, Rfl: 3 
  rosuvastatin (CRESTOR) 10 mg tablet, Take 1 Tab by mouth nightly., Disp: 90 Tab, Rfl: 3 
  desvenlafaxine succinate (PRISTIQ) 50 mg ER tablet, Take 1 Tab by mouth daily. , Disp: 90 Tab, Rfl: 3 
  DULoxetine (CYMBALTA) 30 mg capsule, Take 1 Cap by mouth daily. , Disp: 90 Cap, Rfl: 3 
  fluticasone (FLONASE) 50 mcg/actuation nasal spray, 2 Sprays by Both Nostrils route daily. , Disp: 3 Bottle, Rfl: 3 
  clorazepate (TRANXENE) 3.75 mg tablet, Take 1 Tab by mouth two (2) times daily as needed. Max Daily Amount: 7.5 mg. (Patient taking differently: Take 3.75 mg by mouth three (3) times daily.), Disp: 180 Tab, Rfl: 1   cholecalciferol, VITAMIN D3, (VITAMIN D3) 5,000 unit tab tablet, Take  by mouth daily. , Disp: , Rfl:  
  cyanocobalamin (VITAMIN B12) 500 mcg tablet, Take 500 mcg by mouth daily. , Disp: , Rfl:  
  acetaminophen (TYLENOL) 500 mg tablet, Take 1,000 mg by mouth daily as needed for Pain., Disp: , Rfl:   
Date Last Reviewed:  1/3/2019 Number of Personal Factors/Comorbidities that affect the Plan of Care: 0: LOW COMPLEXITY EXAMINATION:  
Observation/Orthostatic Postural Assessment:  Patient exhibits mild rounded shoulders and forward head posture. ROM:    
  RIGHT LEFT Shoulder flexion  WNL, painful at end range WNL Shoulder external rotation WNL WNL Shoulder internal rotation WNL WNL Strength:    
  RIGHT  LEFT Shoulder flexion  4/5 5/5 Shoulder ER 4/5, painful 5/5 Shoulder IR 5/5 5/5 Scapular retraction 4/5 4/5 Functional Mobility: RIGHT LEFT A[place]y IR  L5 L1 A[place]y ER T2 T2 Horizontal adduction Painful Normal  
 Active impingement  Painful Normal  
     
 Joint Mobility:     
  RIGHT LEFT Glenohumeral Hypomobile Normal  
     
 
 
  
Body Structures Involved: 1. Bones 2. Joints 3. Muscles Body Functions Affected: 1. Sensory/Pain 2. Neuromusculoskeletal 
3. Movement Related Activities and Participation Affected: 1. General Tasks and Demands 2. Mobility 3. Self Care 4. Domestic Life 5. Community, Social and [place] Number of elements (examined above) that affect the Plan of Care: 4+: HIGH COMPLEXITY CLINICAL PRESENTATION:  
Presentation: Stable and uncomplicated: LOW COMPLEXITY CLINICAL DECISION MAKING:  
Outcome Measure: Tool Used: Disabilities of the Arm, Shoulder and Hand (DASH) Questionnaire - Quick Version Score:  Initial: 23/55 (11/1/18) Most Recent: X/55 (Date: -- ) Interpretation of Score: The DASH is designed to measure the activities of daily living in person's with upper extremity dysfunction or pain. Each section is scored on a 1-5 scale, 5 representing the greatest disability. The scores of each section are added together for a total score of 55. Score 11 12-19 20-28 29-37 38-45 46-54 55 Modifier CH CI CJ CK CL CM CN ? Carrying, Moving, and Handling Objects:  
  - CURRENT STATUS: CJ - 20%-39% impaired, limited or restricted  - GOAL STATUS: CI - 1%-19% impaired, limited or restricted  - D/C STATUS:  ---------------To be determined--------------- Medical Necessity:  
· Patient is expected to demonstrate progress in strength and range of motion to increase independence with ADL tasks. Reason for Services/Other Comments: 
· Patient continues to require present interventions due to patient's inability to reach across her body and lift heavy objects without pain.   
Use of outcome tool(s) and clinical judgement create a POC that gives a: Clear prediction of patient's progress: LOW COMPLEXITY  
  
 
 
 
TREATMENT:  
(In addition to Assessment/Re-Assessment sessions the following treatments were rendered) Pre-treatment Symptoms/Complaints:  Patient says she has felt good the past few days. Pain: Initial:  
Pain Intensity 1: 1 /10 Post Session:  0/10 Therapeutic Exercise: (45 Minutes):  Exercises per grid below to improve mobility and strength. Required moderate visual, verbal and manual cues to promote proper body alignment, promote proper body posture and promote proper body mechanics. Progressed resistance, range, repetitions and complexity of movement as indicated. Date: 
1/3/2019 Activity/Exercise Parameters Patient Education Update HEP  
AROM progression Supine punch x 20, flexion x 20, Sidelying ER x 20, Abduction x 20 Scapular retraction --  
Counter walk back --  
Pendulum -- Foam roll flexion --  
UE ranger flexion Wall slide, x15 Rows Green x 30 Band pull apart Yellow x 20 Triceps Green, x30 Flexion to abduction eccentric x30 Ball circles On wall, cw, ccw, ABCs Ball PNF D1 Yellow ball, 2 x 10 Manual Therapy (     ): Manual techniques to facilitate improved motion and decreased pain. (Used abbreviations: MET - muscle energy technique; PNF - proprioceptive neuromuscular facilitation; NMR - neuromuscular re-education; a/p - anterior to posterior; p/a - posterior to anterior) · Glenohumeral joint mobilizations, posterior/inferior glides (not performed today) · Manual ER, flexion ROM (not performed today) Treatment/Session Assessment:   
· Response to Treatment:  Patient has less discomfort with active flexion and abduction today. Endurance and overall UE strength continues to be patient's biggest limitation but has shown increases in strength training tolerance over last few sessions. Patient to follow up in a few weeks after vacation. · Compliance with Program/Exercises: Compliant most of the time. · Recommendations/Intent for next treatment session: \"Next visit will focus on advancements to more challenging activities\". Total Treatment Duration: 45 minutes PT Patient Time In/Time Out Time In: 1330 Time Out: [address] [place] [place] Future Appointments Date Time Provider [facility] 1/8/2019 10:40 AM Myles Will MD [address] [place] [address]  
1/15/2019 10:00 AM CAF LAB [facility]  
1/22/2019 11:00 AM Maranda Shoemaker MD [facility]  
1/25/2019 11:00 AM Khadra Vazquez [facility]

## 2019-01-25 ENCOUNTER — APPOINTMENT (OUTPATIENT)
Dept: PHYSICAL THERAPY | Age: 74
End: 2019-01-25
Payer: MEDICARE

## 2019-04-05 NOTE — THERAPY DISCHARGE
Gibson Mckeon : 1945 [place] Secondary: 17[address] [facility] [address], [address] [place] [address] [phone]   [phone] OUTPATIENT PHYSICAL THERAPY:Discontinuation Summary 1/3/2019 ICD-10: Treatment Diagnosis: Pain in right shoulder (M25.511), Muscle weakness, generalized (M62.81) Precautions/Allergies:  
Levaquin [levofloxacin]; Celebrex [celecoxib]; Cephalexin; Codeine; Dilaudid [hydromorphone]; Pcn [penicillins]; and Prilosec [omeprazole] Fall Risk Score: 0 (? 5 = High Risk) MD Orders: Evaluate and Treat MEDICAL/REFERRING DIAGNOSIS: 
Pain in right shoulder [M25.511] DATE OF ONSET: 2018 REFERRING PHYSICIAN: Eduardo Wheeler MD 
RETURN PHYSICIAN APPOINTMENT: TBD INITIAL ASSESSMENT:  Ms. Aisha Tavera presents with right shoulder pain which began a few months ago without known cause. Her chief complaint is pain but also presents with decreased shoulder mobility and strength which limits her ability to perform ADL and self care tasks without pain or difficulty. She will benefit from skilled therapy to improve her pain, strength and mobility to return to prior level of function. 19: Patient self discharging from therapy and will continue with independent HEP at this time. PROBLEM LIST (Impacting functional limitations): 1. Decreased Strength 2. Decreased ADL/Functional Activities 3. Increased Pain 4. Decreased Activity Tolerance 5. Decreased Flexibility/Joint Mobility 6. Decreased [place] with Home Exercise Program INTERVENTIONS PLANNED: 
1. Home Exercise Program (HEP) 2. Manual Therapy 3. Range of Motion (ROM) 4. Therapeutic Exercise/Strengthening TREATMENT PLAN: 
 
GOALS: (Goals have been discussed and agreed upon with patient.) Discharge Goals: 1.  Patient will be independent with home exercise program without assistance from therapist. MET 
 2. Patient will report Quick DASH score to 20/55 or less to demonstrate improved functional capacity. MADE PROGRESS, NOT MET 3. Patient will demonstrate pain free shoulder overhead flexion to return to normal ADL function without difficulty. MADE PROGRESS, NOT MET 4. Patient will perform full and symmetrical shoulder IR behind her back to demonstrate improved functional mobility. MADE PROGRESS, NOT MET 5. Patient will perform lift, carry and place with #10 or greater to demonstrate improved UE strength. MADE PROGRESS, NOT MET Rehabilitation Potential For Stated Goals: Ilan Cristian Cantrell has been seen in physical therapy from 11/1/18 to 1/3/19 for 7 visits. Treatment has been discontinued at this time due to patient failing to return for additional treatment. The below goals were met prior to discontinuation. Some goals were not met due to self discharge. Thank you for this referral.    
 
 
Recommendations: Discharge from physical therapy. Sebastian Koehler. [place]

## 2019-06-03 PROBLEM — F33.0 DEPRESSION, MAJOR, RECURRENT, MILD (HCC): Status: ACTIVE | Noted: 2019-06-03

## 2019-08-01 ENCOUNTER — HOSPITAL ENCOUNTER (OUTPATIENT)
Dept: MAMMOGRAPHY | Age: 74
Discharge: HOME | End: 2019-08-01
Attending: OBSTETRICS & GYNECOLOGY
Payer: MEDICARE

## 2019-08-01 DIAGNOSIS — Z12.31 VISIT FOR SCREENING MAMMOGRAM: ICD-10-CM

## 2019-08-01 PROCEDURE — 77063 BREAST TOMOSYNTHESIS BI: CPT

## 2020-08-27 ENCOUNTER — HOSPITAL ENCOUNTER (OUTPATIENT)
Dept: MAMMOGRAPHY | Age: 75
Discharge: HOME | End: 2020-08-27
Attending: OBSTETRICS & GYNECOLOGY
Payer: MEDICARE

## 2020-08-27 DIAGNOSIS — Z12.31 OTHER SCREENING MAMMOGRAM: ICD-10-CM

## 2020-08-27 PROCEDURE — 77063 BREAST TOMOSYNTHESIS BI: CPT

## 2020-11-03 ENCOUNTER — HOSPITAL ENCOUNTER (OUTPATIENT)
Dept: LAB | Age: 75
Discharge: HOME | End: 2020-11-03

## 2020-11-03 PROCEDURE — 88305 TISSUE EXAM BY PATHOLOGIST: CPT

## 2020-11-03 PROCEDURE — 88312 SPECIAL STAINS GROUP 1: CPT

## 2021-01-15 NOTE — DISCHARGE SUMMARY
Discharge Note    Mirella Mayer  Admission date:  8/12/2018  Discharge date:  8/15/2018    Admitting Diagnosis:  Biliary calculus with acute cholecystitis;acute reuben    Discharge Diagnoses:    Hospital Problems  Date Reviewed: 8/21/2017          Codes Class Noted POA    * (Principal)Biliary calculus with acute cholecystitis ICD-10-CM: K80.00  ICD-9-CM: 574.00  8/13/2018 Unknown              Consultations: None    Procedures/studies:    Procedure(s):  [address] Course: This patient was admitted by the hospitalist with right upper quadrant abdominal pain history of subtotal colectomy for Crohn's disease. A gallbladder ultrasound revealed gallstones and a laparoscopic cholecystectomy was performed without immediate complications. On hospital day 2 patient was tolerating her pain on oral analgesics and was discharged home to follow-up in the office in 2 weeks. Physical Exam:   GEN: well developed and in no acute distress   HEENT: PERRL, EOMI, no alar flaring or epistaxis, oral mucosa moist without cyanosis,   NECK: no JVD, no retractions, no thyromegaly or masses,   LUNGS: resp even/unlab on room air   HEART: RRR with no M,G,R;  ABDOMEN: soft with no tenderness, no peritoneal signs, rebound, or guarding  EXTREMITIES: warm with no cyanosis, no upper or lower extremity edema  SKIN: no jaundice; rashes, sores, or ecchymoses  NEURO:  Awake, alert, oriented x3, no distress, nonfocal exam.    No results for input(s): WBC, HGB, HCT, PLT, HGBEXT, HCTEXT, PLTEXT in the last 72 hours. No results for input(s): NA, K, CL, CO2, BUN, CREA, MG, PHOS, TROIQ, INR, BNPP in the last 72 hours. No lab exists for component: TROIP, INREXT    Discharge Medications: Cannot display discharge medications since this patient is not currently admitted.       Diet: Regular  Activity: No lifting over 10 pounds for 2 weeks  Follow-up: 2 weeks    Ramon Navarro DO CDU PROGRESS NOTE ESEQUIEL BAPTISTE: No interval change. pt reports persistent pain at left foot. L foot dressing changed, erythema on dorsal aspect noted with I&D site intact. NAD. VSS. will control pain, continue to monitor

## 2021-02-08 NOTE — ED NOTES
9/4/20 FOllow up from EGD. 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  Does not have signs of celiac on duodenal biopsies.  Is well and has otherwise been fine. I detailed the diagnosis as well as treatments with mom.  Will plan to get allergy testing and start PPI and budseonide. I gave a handout onhow to mix it and make it to mom.  No other issues or concerns     4 yo here for new patient visit for the problem of vomiting. She is otherwise well. Mom reports that she had small spitting up as a baby. Was breast fed and then formula fed cow milk based formula without issue. She has grown and developed well. She had worsening vomiting after she turned 1 and has remained more or less steady since. Has 3-4 vomits per week. Last vomit was two days ago.  She will sometimes experience pain but typically these come without warning and without a pattern. She will have regurgitation of stomach contents and then be otherwise well. She will often complete a meal after having an episode like this. SHe is not having blood or bile int he emesis. She is growing and developing very well and I reviewed her growth chart and she is above the 95ht percentile for BMI for her age.  She does not have choking or dysphagia.  She does not have food caught in throat or chest when swallowing. She has intermittent hard stools which a relieved with PRN miralax. She does not have blood in her stool. She does not experience nausea. She does not get headaches. She has not had vision changes.  She appears well today and is happy and playful.  Manuelito Lopez Had a previous evaluation of EGD by Dr. Chema Nash.  It was grossly and histologically normal.  They briefly tried periactin after this but it made her to sleepy so stopped Eve Lovelace

## 2021-04-14 ENCOUNTER — HOSPITAL ENCOUNTER (OUTPATIENT)
Dept: PHYSICAL THERAPY | Age: 76
Discharge: HOME | End: 2021-04-14
Payer: MEDICARE

## 2021-04-14 PROCEDURE — 97110 THERAPEUTIC EXERCISES: CPT

## 2021-04-14 PROCEDURE — 97161 PT EVAL LOW COMPLEX 20 MIN: CPT

## 2021-04-14 NOTE — THERAPY EVALUATION
Evelia Khan : 1945 [place] Secondary: [address] [facility] at [address], [address] [place] [address] [phone]   [phone] OUTPATIENT PHYSICAL THERAPY:Initial Assessment 2021 ICD-10: Treatment Diagnosis: Low back pain (M54.5), Muscle weakness, generalized (M62.81) Precautions/Allergies:  
Levaquin [levofloxacin], Celebrex [celecoxib], Cephalexin, Codeine, Dilaudid [hydromorphone], Pcn [penicillins], and Prilosec [omeprazole] TREATMENT PLAN: 
Effective Dates: 2021 TO 2021 (90 days). Frequency/Duration: 2 times a week for 90 Day(s) MEDICAL/REFERRING DIAGNOSIS: 
Low back pain [M54.5] Other chronic pain [G89.29] DATE OF ONSET: Acute on chronic REFERRING PHYSICIAN: Stan Powers MD MD Orders: Evaluate and Treat Return MD Appointment: TBD INITIAL ASSESSMENT:  Ms. Doris Miller presents with acute on chronic low back pain which has worsened since last fall. She reports central low back pain as her chief complaint but also presents with decreased lumbopelvic control and overall LE strength that limits her ADL function. She will benefit from skilled therapy to improve her pain, strength and mobility to return to prior level of function. PROBLEM LIST (Impacting functional limitations): 1. Decreased Strength 2. Decreased ADL/Functional Activities 3. Decreased Balance 4. Increased Pain 5. Decreased Activity Tolerance 6. Decreased Flexibility/Joint Mobility 7. Decreased [place] with Home Exercise Program INTERVENTIONS PLANNED: (Treatment may consist of any combination of the following) 1. Balance Exercise 2. Home Exercise Program (HEP) 3. Manual Therapy 4. Neuromuscular Re-education/Strengthening 5. Therapeutic Exercise/Strengthening GOALS: (Goals have been discussed and agreed upon with patient.) Discharge Goals: Time Frame: 90 days 1.  Patient will be independent with home exercise program without assistance from therapist.  
2. Patient will report DIANDRA score to 5/50 or less to demonstrate improved functional capacity. 3. Patient will demonstrate pain free lifting and walking to resume normal ADL function around her home. 4. Patient will perform squatting and stairs pain free to demonstrate improved functional mobility. OUTCOME MEASURE:  
Tool Used: Modified Oswestry Low Back Pain Questionnaire Score:  Initial: 11/50 (4/14/21)  Most Recent: X/50 (Date: -- ) Interpretation of Score: Each section is scored on a 0-5 scale, 5 representing the greatest disability. The scores of each section are added together for a total score of 50. MEDICAL NECESSITY:  
· Patient is expected to demonstrate progress in strength and range of motion to increase independence with ADL tasks. REASON FOR SERVICES/OTHER COMMENTS: 
· Patient continues to require present interventions due to patient's inability to walk, bend or carry without pain. Total Duration: PT Patient Time In/Time Out Time In: 1500 Time Out: 1600 Rehabilitation Potential For Stated Goals: Good Regarding [employer] therapy, I certify that the treatment plan above will be carried out by a therapist or under their direction. Thank you for this referral, Yang Vazquez Referring Physician Signature: Lazarus Chowdary MD _______________________________ Date _____________ PAIN/SUBJECTIVE:  
Initial: Pain Intensity 1: 3 /10 Post Session:  2/10 HISTORY:  
History of Injury/Illness (Reason for Referral): Renny Deleon presents with acute on chronic low back pain. She reports multi-year history of back pain but has slowly worsened since September 2020 after she got a puppy and amount of bending and lifting increased to care for dog. She reports some intermittent radiating pain to lateral left hip but states it is currently improved as of a few weeks ago.  She underwent x-ray imaging which revealed some arthritis in her lumbar spine, per patient. Her goals are to resume normal daily work around her home without pain. Past Medical History/Comorbidities: Ms. Jemal Berry  has a past medical history of Anemia, Angioleiomyoma, Arthritis, Bronchiectasis ([address] Bronchiectasis ([address] Calculus of gallbladder without cholecystitis (2018), Cancer ([address] Chronic kidney disease, Crohn's disease ([address] Depression, Dry eye, Fibromyalgia, Hypercholesterolemia, Hypertension, Multiple renal cysts, JASON (obstructive sleep apnea), Renal cysts, acquired, bilateral, and Skin cancer (2016). Ms. Jemal Berry  has a past surgical history that includes hx total colectomy; hx  section; and hx cholecystectomy (2018). Social History/Living Environment:  
  Lives in private setting home, no barriers to progress Prior Level of Function/Work/Activity: 
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 
     No Risk Factors Identified Ability to Rise from Chair: 
     (0)  Ability to rise in a single movement Falls Prevention Plan: No modifications necessary Total: (5 or greater = High Risk): 0  
© [facility] [place] [address] [place] [address] States Patent #[id number]. Federal Law prohibits the replication, distribution or use without written permission from [facility] [employer] Current Medications:   
  
Current Outpatient Medications:  
  desvenlafaxine succinate (PRISTIQ) 50 mg ER tablet, Take 1 Tab by mouth daily. , Disp: 90 Tab, Rfl: 3 
  simethicone (Gas-X) 125 mg capsule, Take 125 mg by mouth daily as needed for Flatulence. , Disp: , Rfl:  
  cycloSPORINE (RESTASIS) 0.05 % dpet, 1 Drop two (2) times a day., Disp: , Rfl:  
  zolpidem (AMBIEN) 5 mg tablet, Take 1 Tab by mouth nightly. Max Daily Amount: 5 mg., Disp: 30 Tab, Rfl: 3 
  fluticasone propionate (FLONASE) 50 mcg/actuation nasal spray, 2 Sprays by Both Nostrils route daily. , Disp: 3 Bottle, Rfl: 3 
  clorazepate (TRANXENE) 3.75 mg tablet, Take 1 Tab by mouth daily as needed for Anxiety. , Disp: 30 Tab, Rfl: 3 
  DULoxetine (CYMBALTA) 30 mg capsule, Take 1 Cap by mouth daily. , Disp: 90 Cap, Rfl: 1   rosuvastatin (CRESTOR) 10 mg tablet, Take 1 Tab by mouth nightly., Disp: 90 Tab, Rfl: 3 
  lisinopril-hydroCHLOROthiazide (PRINZIDE, ZESTORETIC) 10-12.5 mg per tablet, Take 1 Tab by mouth daily. , Disp: 90 Tab, Rfl: 3 
  famotidine (PEPCID) 20 mg tablet, Take 20 mg by mouth daily. , Disp: , Rfl:  
  cholecalciferol, VITAMIN D3, (VITAMIN D3) 5,000 unit tab tablet, Take  by mouth daily. , Disp: , Rfl:  
  cyanocobalamin (VITAMIN B12) 500 mcg tablet, Take 500 mcg by mouth daily. , Disp: , Rfl:  
  acetaminophen (TYLENOL) 500 mg tablet, Take 1,000 mg by mouth daily as needed for Pain., Disp: , Rfl:   
Date Last Reviewed:  4/14/2021 Number of Personal Factors/Comorbidities that affect the Plan of Care: 0: LOW COMPLEXITY EXAMINATION:  
Observation/Orthostatic Postural Assessment:   
      Patient exhibits increased lumbar lordosis, thoracic kyphosis in static standing. Palpation:   
      Patient is tender at spinous processes L3-S1 and bilateral PSIS along with left hip rotator musculature. Motion Testing:    
  RIGHT LEFT Flexion FP FP Extension DP DP Side Bending DN DN  
 Rotation DN DN Hip motion DN DN Strength:    
  RIGHT LEFT Transverse Abdominis 3/5, poor coordination 3/5, poor coordination Hip Extension 4/5 4/5 Hip Abduction 4/5 4/5 Joint Mobility:     
  RIGHT LEFT Hip WNL WNL Flexibility:    
  RIGHT LEFT Hamstrings ASLR >70 ASLR >70 Hip Flexor -Volodymyr Lanius Abbreviations: DP- dysfunctional painful, DN- dysfunctional nonpainful, FN- functional non painful, FP- functional painful Body Structures Involved: 1. Nerves 2. Bones 3. Joints 4. Muscles Body Functions Affected: 1. Sensory/Pain 2. Neuromusculoskeletal 
3. Movement Related Activities and Participation Affected: 1. General Tasks and Demands 2. Mobility 3. Self Care 4. Domestic Life 5. Community, Social and [place] Number of elements (examined above) that affect the Plan of Care: 4+: HIGH COMPLEXITY CLINICAL PRESENTATION:  
Presentation: Stable and uncomplicated: LOW COMPLEXITY CLINICAL DECISION MAKING:  
Use of outcome tool(s) and clinical judgement create a POC that gives a: Clear prediction of patient's progress: LOW COMPLEXITY Yes

## 2021-04-14 NOTE — PROGRESS NOTES
Hung Arshad  : 1945  [place]  Secondary: [address] [facility] at [place] [address], [address] [facility]  [phone]   [phone]        OUTPATIENT PHYSICAL THERAPY: Daily Treatment Note 2021  Visit Count:  1    ICD-10: Treatment Diagnosis: Low back pain (M54.5), Muscle weakness, generalized (M62.81)  Precautions/Allergies:   Levaquin [levofloxacin], Celebrex [celecoxib], Cephalexin, Codeine, Dilaudid [hydromorphone], Pcn [penicillins], and Prilosec [omeprazole]   TREATMENT PLAN:  Effective Dates: 2021 TO 2021 (90 days). Frequency/Duration: 2 times a week for 90 Day(s)    Pre-treatment Symptoms/Complaints:  Patient says her back pain has slowly worsened since 2020. Pain: Initial: Pain Intensity 1: 3 /10 Post Session:  2/10   Medications Last Reviewed:  2021  Updated Objective Findings:  See evaluation note from today  TREATMENT:     Therapeutic Exercise: (15 Minutes):  Exercises per grid below to improve mobility and strength. Required moderate visual, verbal and manual cues to promote proper body alignment, promote proper body posture, promote proper body mechanics and promote proper body breathing techniques. Progressed resistance, range, repetitions and complexity of movement as indicated. Date:  2021     Activity/Exercise Parameters   Patient Education Plan of care, HEP   Trunk rotation x20   Knee to chest Single; x5 each side   Pelvic tilts Supine; x5 each direction   Posterior pelvic tilt with brace x5               Treatment/Session Summary:    · Response to Treatment:  Patient has good initial tolerance to HEP tasks today. · Communication/Consultation:  None today  · Equipment provided today:  None today  · Recommendations/Intent for next treatment session: Next visit will focus on improving pain, strength and mobility.     Total Treatment Billable Duration:  15 minutes (45 minute evaluation)   PT Patient Time In/Time Out  Time In: 1500  Time Out: [address] [facility].  [place]    Future Appointments   Date Time Provider Emily Tuttle   4/20/2021 11:30 AM Zina Barry [facility]   4/22/2021 11:30 AM percy Guillermina Loud [facility]   4/28/2021  2:15 PM Royce Sampsonth Loud [facility]   4/30/2021  1:30 PM Zina Raker [facility]   5/3/2021  1:30 PM Zina Raker [facility]   5/5/2021  2:00 PM Zina Raker [facility]   5/11/2021  1:30 PM Zina Raker [facility]   5/13/2021  1:30 PM percy Guillermina Loud [facility]

## 2021-04-20 ENCOUNTER — HOSPITAL ENCOUNTER (OUTPATIENT)
Dept: PHYSICAL THERAPY | Age: 76
Discharge: HOME | End: 2021-04-20
Payer: MEDICARE

## 2021-04-20 PROCEDURE — 97140 MANUAL THERAPY 1/> REGIONS: CPT

## 2021-04-20 PROCEDURE — 97110 THERAPEUTIC EXERCISES: CPT

## 2021-04-20 NOTE — PROGRESS NOTES
Bradly Goins  : 1945  [place]  Secondary: [address] [facility] at [place] [address], [address] [facility]  [phone]   [phone]        OUTPATIENT PHYSICAL THERAPY: Daily Treatment Note 2021  Visit Count:  2    ICD-10: Treatment Diagnosis: Low back pain (M54.5), Muscle weakness, generalized (M62.81)  Precautions/Allergies:   Levaquin [levofloxacin], Celebrex [celecoxib], Cephalexin, Codeine, Dilaudid [hydromorphone], Pcn [penicillins], and Prilosec [omeprazole]   TREATMENT PLAN:  Effective Dates: 2021 TO 2021 (90 days). Frequency/Duration: 2 times a week for 90 Day(s)    Pre-treatment Symptoms/Complaints:  Patient says she is sore along her left hip today. Pain: Initial: Pain Intensity 1: 2 10 Post Session:  2/10   Medications Last Reviewed:  2021  Updated Objective Findings:  None Today  TREATMENT:     Therapeutic Exercise: (40 Minutes):  Exercises per grid below to improve mobility and strength. Required moderate visual, verbal and manual cues to promote proper body alignment, promote proper body posture, promote proper body mechanics and promote proper body breathing techniques. Progressed resistance, range, repetitions and complexity of movement as indicated. Date:  2021     Activity/Exercise Parameters   Patient Education Plan of care, HEP   Trunk rotation x20   Knee to chest Single; x5 each side   Pelvic tilts Supine; x5 each direction   Posterior pelvic tilt with brace x5   Clam shell Red band, 2 x 10 each side   Nerve glides Sciatic; 2 x 10   Side steps 10 ft x 10   Bike For ROM; x 5 min     Manual Therapy (    Soft Tissue Mobilization Duration  Duration: 15 Minutes): Manual techniques to facilitate improved motion and decreased pain.  (Used abbreviations: MET - muscle energy technique; PNF - proprioceptive neuromuscular facilitation; NMR - neuromuscular re-education; a/p - anterior to posterior; p/a - posterior to anterior)   · Soft tissue mobilization, lateral hip, left glut    Treatment/Session Summary:    · Response to Treatment:  Patient reports less posterior hip pain with radicular symptoms following manual therapy and exercises to strengthen hip rotators today. · Communication/Consultation:  None today  · Equipment provided today:  None today  · Recommendations/Intent for next treatment session: Next visit will focus on improving pain, strength and mobility. Total Treatment Billable Duration:  55 minutes  PT Patient Time In/Time Out  Time In: 1130  Time Out: [address] [facility] [place] [place]  [place]    Future Appointments   Date Time Provider Emily Tuttle   4/22/2021 11:30 AM Donell Carr [facility]   4/28/2021  2:15 PM Yvan Burciaga [facility]   4/30/2021  1:30 PM Yvan Burciaga [facility]   5/3/2021  1:30 PM Donell Carr [facility]   5/5/2021  2:00 PM Donell Carr [facility]   5/11/2021  1:30 PM Donell Carr [facility]   5/13/2021  1:30 PM Adilson Vazquez [facility]

## 2021-04-22 ENCOUNTER — HOSPITAL ENCOUNTER (OUTPATIENT)
Dept: PHYSICAL THERAPY | Age: 76
Discharge: HOME | End: 2021-04-22
Payer: MEDICARE

## 2021-04-22 PROCEDURE — 97110 THERAPEUTIC EXERCISES: CPT

## 2021-04-22 PROCEDURE — 97140 MANUAL THERAPY 1/> REGIONS: CPT

## 2021-04-22 NOTE — PROGRESS NOTES
Corrinne Gaudier  : 1945  [place]  Secondary: [facility] [address] [facility]  at [place] [address], [address] [facility]  [phone]   [phone]        OUTPATIENT PHYSICAL THERAPY: Daily Treatment Note 2021  Visit Count:  3    ICD-10: Treatment Diagnosis: Low back pain (M54.5), Muscle weakness, generalized (M62.81)  Precautions/Allergies:   Levaquin [levofloxacin], Celebrex [celecoxib], Cephalexin, Codeine, Dilaudid [hydromorphone], Pcn [penicillins], and Prilosec [omeprazole]   TREATMENT PLAN:  Effective Dates: 2021 TO 2021 (90 days). Frequency/Duration: 2 times a week for 90 Day(s)    Pre-treatment Symptoms/Complaints:  Patient reports she has less soreness today. Pain: Initial: Pain Intensity 1: 2 10 Post Session:  2/10   Medications Last Reviewed:  2021  Updated Objective Findings:  None Today  TREATMENT:     Therapeutic Exercise: (40 Minutes):  Exercises per grid below to improve mobility and strength. Required moderate visual, verbal and manual cues to promote proper body alignment, promote proper body posture, promote proper body mechanics and promote proper body breathing techniques. Progressed resistance, range, repetitions and complexity of movement as indicated. Date:  2021     Activity/Exercise Parameters   Patient Education Plan of care, HEP   Trunk rotation x20   Knee to chest Single; x5 each side   Pelvic tilts Supine; x5 each direction   Posterior pelvic tilt with brace x5   Clam shell Red band, 2 x 10 each side   Nerve glides Sciatic; 2 x 10   Side steps 10 ft x 10   Bike For ROM; x 5 min   Sit to stand 2 x 10   3-way hip x10 each side     Manual Therapy (    Soft Tissue Mobilization Duration  Duration: 15 Minutes): Manual techniques to facilitate improved motion and decreased pain.  (Used abbreviations: MET - muscle energy technique; PNF - proprioceptive neuromuscular facilitation; NMR - neuromuscular re-education; a/p - anterior to posterior; p/a - posterior to anterior)   · Soft tissue mobilization, lateral hip, left glut    Treatment/Session Summary:    · Response to Treatment:  Patient reports continued decrease in low back symptoms with activity and responds well to more LE strength tasks today. · Communication/Consultation:  None today  · Equipment provided today:  None today  · Recommendations/Intent for next treatment session: Next visit will focus on improving pain, strength and mobility. Total Treatment Billable Duration:  55 minutes  PT Patient Time In/Time Out  Time In: 1130  Time Out: [address].  [place]    Future Appointments   Date Time Provider Emily Tuttle   4[facility]28/2021  [facility]   4/30/2021  1:30 PM Francisco Ignacio [facility]   5/3/2021  1:30 PM Francisco Ignacio [facility]   5/5/2021  2:00 PM Austen Mcguire [facility]   5/11/2021  1:30 PM [facility]   5/13/2021  1:30 PM [facility]

## 2021-04-28 ENCOUNTER — APPOINTMENT (OUTPATIENT)
Dept: PHYSICAL THERAPY | Age: 76
End: 2021-04-28
Payer: MEDICARE

## 2021-04-30 ENCOUNTER — HOSPITAL ENCOUNTER (OUTPATIENT)
Dept: PHYSICAL THERAPY | Age: 76
Discharge: HOME | End: 2021-04-30
Payer: MEDICARE

## 2021-04-30 PROCEDURE — 97140 MANUAL THERAPY 1/> REGIONS: CPT

## 2021-04-30 PROCEDURE — 97110 THERAPEUTIC EXERCISES: CPT

## 2021-04-30 NOTE — PROGRESS NOTES
Geneva Robertson  : 1945  [place]  Secondary: [address] [facility] at [place] [address], [address] [facility]  [phone]   [phone]        OUTPATIENT PHYSICAL THERAPY: Daily Treatment Note 2021  Visit Count:  4    ICD-10: Treatment Diagnosis: Low back pain (M54.5), Muscle weakness, generalized (M62.81)  Precautions/Allergies:   Levaquin [levofloxacin], Celebrex [celecoxib], Cephalexin, Codeine, Dilaudid [hydromorphone], Pcn [penicillins], and Prilosec [omeprazole]   TREATMENT PLAN:  Effective Dates: 2021 TO 2021 (90 days). Frequency/Duration: 2 times a week for 90 Day(s)    Pre-treatment Symptoms/Complaints:  Patient says she has some left sided discomfort today but isn't sure of the cause. Pain: Initial: Pain Intensity 1: 2 10 Post Session:  2/10   Medications Last Reviewed:  2021  Updated Objective Findings:  None Today  TREATMENT:     Therapeutic Exercise: (40 Minutes):  Exercises per grid below to improve mobility and strength. Required moderate visual, verbal and manual cues to promote proper body alignment, promote proper body posture, promote proper body mechanics and promote proper body breathing techniques. Progressed resistance, range, repetitions and complexity of movement as indicated. Date:  2021     Activity/Exercise Parameters   Patient Education Plan of care, HEP   Trunk rotation x20   Knee to chest Single; x5 each side   Pelvic tilts Supine; x5 each direction   Posterior pelvic tilt with brace x5   Clam shell Red band, 2 x 10 each side   Nerve glides --   Side steps 10 ft x 10   Ball rolls 2 x 10   Bike For ROM; x 5 min   Sit to stand 2 x 10   3-way hip x10 each side     Manual Therapy (    Soft Tissue Mobilization Duration  Duration: 15 Minutes): Manual techniques to facilitate improved motion and decreased pain.  (Used abbreviations: MET - muscle energy technique; PNF - proprioceptive neuromuscular facilitation; NMR - neuromuscular re-education; a/p - anterior to posterior; p/a - posterior to anterior)   · Soft tissue mobilization, lateral hip, left glut    Treatment/Session Summary:    · Response to Treatment:  Patient continues to have good tolerance to more flexion based activity and reports decreased discomfort at end of session. · Communication/Consultation:  None today  · Equipment provided today:  None today  · Recommendations/Intent for next treatment session: Next visit will focus on improving pain, strength and mobility. Total Treatment Billable Duration:  55 minutes  PT Patient Time In/Time Out  Time In: 1325  Time Out: [address]  [place]    Future Appointments   Date Time Provider Emily Tuttle   5/3/2021 10:30 AM Marjo Mast [facility]   5/5/2021  2:00 PM Marjo Mast [facility]   5/11/2021  1:30 PM Marjo Mast [facility]   5/13/2021  1:30 PM Casey Vazquez [facility]

## 2021-05-03 ENCOUNTER — HOSPITAL ENCOUNTER (OUTPATIENT)
Dept: PHYSICAL THERAPY | Age: 76
Discharge: HOME | End: 2021-05-03
Payer: MEDICARE

## 2021-05-03 PROCEDURE — 97140 MANUAL THERAPY 1/> REGIONS: CPT

## 2021-05-03 PROCEDURE — 97110 THERAPEUTIC EXERCISES: CPT

## 2021-05-03 NOTE — PROGRESS NOTES
Kulwant Michael  : 1945  [place]  Secondary: [address] [facility] at [place] [address], [address] [facility]  [phone]   [phone]        OUTPATIENT PHYSICAL THERAPY: Daily Treatment Note 5/3/2021  Visit Count:  5    ICD-10: Treatment Diagnosis: Low back pain (M54.5), Muscle weakness, generalized (M62.81)  Precautions/Allergies:   Levaquin [levofloxacin], Celebrex [celecoxib], Cephalexin, Codeine, Dilaudid [hydromorphone], Pcn [penicillins], and Prilosec [omeprazole]   TREATMENT PLAN:  Effective Dates: 2021 TO 2021 (90 days). Frequency/Duration: 2 times a week for 90 Day(s)    Pre-treatment Symptoms/Complaints:  Patient reports her back has been more stiff in the mornings. Pain: Initial: Pain Intensity 1: 2 10 Post Session:  2/10   Medications Last Reviewed:  5/3/2021  Updated Objective Findings:  None Today  TREATMENT:     Therapeutic Exercise: (40 Minutes):  Exercises per grid below to improve mobility and strength. Required moderate visual, verbal and manual cues to promote proper body alignment, promote proper body posture, promote proper body mechanics and promote proper body breathing techniques. Progressed resistance, range, repetitions and complexity of movement as indicated. Date:  5/3/2021     Activity/Exercise Parameters   Patient Education Plan of care, HEP   Trunk rotation x20   Knee to chest Single; x5 each side   Pelvic tilts Supine; x5 each direction   Posterior pelvic tilt with brace x5   Clam shell Red band, 2 x 10 each side; sidelying x 30   Side bend stretch 10s x 10   Side steps 10 ft x 10   Ball rolls 2 x 10   Bike For ROM; x 5 min   Sit to stand 2 x 10   3-way hip x10 each side     Manual Therapy (    Soft Tissue Mobilization Duration  Duration: 15 Minutes): Manual techniques to facilitate improved motion and decreased pain.  (Used abbreviations: MET - muscle energy technique; PNF - proprioceptive neuromuscular facilitation; NMR - neuromuscular re-education; a/p - anterior to posterior; p/a - posterior to anterior)   · Soft tissue mobilization, lateral hip, left glut    Treatment/Session Summary:    · Response to Treatment:  Patient continues to make slow but steady progress. More LE strength tasks to be added to improve overall activity tolerance. · Communication/Consultation:  None today  · Equipment provided today:  None today  · Recommendations/Intent for next treatment session: Next visit will focus on improving pain, strength and mobility. Total Treatment Billable Duration:  55 minutes  PT Patient Time In/Time Out  Time In: 1030  Time Out: [address] [facility].  [place]    Future Appointments   Date Time Provider Emily Tuttle   5/5/2021  2:00 PM Jessenia Holguin [facility]   5/11/2021  1:30 PM Jessenia Holguin [facility]   5/13/2021  1:30 PM Florinda Vazquez [facility]

## 2021-05-05 ENCOUNTER — HOSPITAL ENCOUNTER (OUTPATIENT)
Dept: PHYSICAL THERAPY | Age: 76
Discharge: HOME | End: 2021-05-05
Payer: MEDICARE

## 2021-05-05 PROCEDURE — 97140 MANUAL THERAPY 1/> REGIONS: CPT

## 2021-05-05 PROCEDURE — 97110 THERAPEUTIC EXERCISES: CPT

## 2021-05-05 NOTE — PROGRESS NOTES
Gaby Narayanan  : 1945  [place]  Secondary: Bshsi Generic [address] [facility]  at [facility], [address] [facility]  [phone]   [phone]        OUTPATIENT PHYSICAL THERAPY: Daily Treatment Note 2021  Visit Count:  6    ICD-10: Treatment Diagnosis: Low back pain (M54.5), Muscle weakness, generalized (M62.81)  Precautions/Allergies:   Levaquin [levofloxacin], Celebrex [celecoxib], Cephalexin, Codeine, Dilaudid [hydromorphone], Pcn [penicillins], and Prilosec [omeprazole]   TREATMENT PLAN:  Effective Dates: 2021 TO 2021 (90 days). Frequency/Duration: 2 times a week for 90 Day(s)    Pre-treatment Symptoms/Complaints:  Patient says she feels much better today. Pain: Initial: Pain Intensity 1: 1 /10 Post Session:  2/10   Medications Last Reviewed:  2021  Updated Objective Findings:  None Today  TREATMENT:     Therapeutic Exercise: (40 Minutes):  Exercises per grid below to improve mobility and strength. Required moderate visual, verbal and manual cues to promote proper body alignment, promote proper body posture, promote proper body mechanics and promote proper body breathing techniques. Progressed resistance, range, repetitions and complexity of movement as indicated. Date:  2021     Activity/Exercise Parameters   Patient Education Plan of care, HEP   Trunk rotation x20   Knee to chest Single; x5 each side   Pelvic tilts Supine; x5 each direction   Posterior pelvic tilt with brace x5   Clam shell Red band, 2 x 10 each side; sidelying x 30   Side bend stretch 10s x 10   Side steps 10 ft x 10   Ball rolls --   Bike For ROM; x 5 min   Sit to stand Goblet; blue ball x 30   3-way hip x10 each side   Shuttle DL- 4 cord x 2 min     Manual Therapy (    Soft Tissue Mobilization Duration  Duration: 15 Minutes): Manual techniques to facilitate improved motion and decreased pain.  (Used abbreviations: MET - muscle energy technique; PNF - proprioceptive neuromuscular facilitation; NMR - neuromuscular re-education; a/p - anterior to posterior; p/a - posterior to anterior)   · Soft tissue mobilization, lateral hip, left glut    Treatment/Session Summary:    · Response to Treatment:  Bharati Rincon reports less back pain and improved tolerance to LE strength tasks today. · Communication/Consultation:  None today  · Equipment provided today:  None today  · Recommendations/Intent for next treatment session: Next visit will focus on improving pain, strength and mobility. Total Treatment Billable Duration:  55 minutes  PT Patient Time In/Time Out  Time In: 1400  Time Out: 1500  Anibal Vazquez    Future Appointments   Date Time Provider Emily Tuttle   5/11/2021  1:30 PM [facility]   5/13/2021  1:30 PM Guillermina Vazquez [facility]

## 2021-05-11 ENCOUNTER — HOSPITAL ENCOUNTER (OUTPATIENT)
Dept: PHYSICAL THERAPY | Age: 76
Discharge: HOME | End: 2021-05-11
Payer: MEDICARE

## 2021-05-11 PROCEDURE — 97140 MANUAL THERAPY 1/> REGIONS: CPT

## 2021-05-11 PROCEDURE — 97110 THERAPEUTIC EXERCISES: CPT

## 2021-05-11 NOTE — PROGRESS NOTES
Des Loving  : 1945  [place]  Secondary: [address] [facility] [place] [address], [address] [facility]  [phone]   [phone]        OUTPATIENT PHYSICAL THERAPY: Daily Treatment Note 2021  Visit Count:  7    ICD-10: Treatment Diagnosis: Low back pain (M54.5), Muscle weakness, generalized (M62.81)  Precautions/Allergies:   Levaquin [levofloxacin], Celebrex [celecoxib], Cephalexin, Codeine, Dilaudid [hydromorphone], Pcn [penicillins], and Prilosec [omeprazole]   TREATMENT PLAN:  Effective Dates: 2021 TO 2021 (90 days). Frequency/Duration: 2 times a week for 90 Day(s)    Pre-treatment Symptoms/Complaints:  Patient reports her back is feeling \"good\" today. Pain: Initial: Pain Intensity 1: 1 /10 Post Session:  2/10   Medications Last Reviewed:  2021  Updated Objective Findings:  None Today  TREATMENT:     Therapeutic Exercise: (40 Minutes):  Exercises per grid below to improve mobility and strength. Required moderate visual, verbal and manual cues to promote proper body alignment, promote proper body posture, promote proper body mechanics and promote proper body breathing techniques. Progressed resistance, range, repetitions and complexity of movement as indicated. Date:  2021     Activity/Exercise Parameters   Patient Education Plan of care, HEP   Trunk rotation x20   Knee to chest Single; x5 each side   Pelvic tilts Supine; x5 each direction   Posterior pelvic tilt with brace --   Clam shell Red band, 2 x 10 each side; sidelying x 30   Side bend stretch --   Side steps 10 ft x 10   Ball rolls 2 x 10   Bike For ROM; x 5 min   Sit to stand Goblet; blue ball x 30   3-way hip x10 each side   Shuttle --     Manual Therapy (    Soft Tissue Mobilization Duration  Duration: 15 Minutes): Manual techniques to facilitate improved motion and decreased pain.  (Used abbreviations: MET - muscle energy technique; PNF - proprioceptive neuromuscular facilitation; NMR - neuromuscular re-education; a/p - anterior to posterior; p/a - posterior to anterior)   · Soft tissue mobilization, lateral hip, left glut  · Long axis hip distraction; left    Treatment/Session Summary:    · Response to Treatment:  Aneudy Nix displays pain free mobility today and has good tolerance to more closed chain exercises during session. · Communication/Consultation:  None today  · Equipment provided today:  None today  · Recommendations/Intent for next treatment session: Next visit will focus on improving pain, strength and mobility. Total Treatment Billable Duration:  55 minutes  PT Patient Time In/Time Out  Time In: 1330  Time Out: [address] [facility]    Future Appointments   Date Time Provider Emily Tuttle   5/13/2021  1[facility]

## 2021-05-13 ENCOUNTER — HOSPITAL ENCOUNTER (OUTPATIENT)
Dept: PHYSICAL THERAPY | Age: 76
Discharge: HOME | End: 2021-05-13
Payer: MEDICARE

## 2021-05-13 PROCEDURE — 97110 THERAPEUTIC EXERCISES: CPT

## 2021-05-13 PROCEDURE — 97140 MANUAL THERAPY 1/> REGIONS: CPT

## 2021-05-13 NOTE — PROGRESS NOTES
Geneva Robertson  : 1945  [place]  Secondary: [address] [facility] [place] [address], [address] [facility]  [phone]   [phone]        OUTPATIENT PHYSICAL THERAPY: Daily Treatment Note 2021  Visit Count:  8    ICD-10: Treatment Diagnosis: Low back pain (M54.5), Muscle weakness, generalized (M62.81)  Precautions/Allergies:   Levaquin [levofloxacin], Celebrex [celecoxib], Cephalexin, Codeine, Dilaudid [hydromorphone], Pcn [penicillins], and Prilosec [omeprazole]   TREATMENT PLAN:  Effective Dates: 2021 TO 2021 (90 days). Frequency/Duration: 2 times a week for 90 Day(s)    Pre-treatment Symptoms/Complaints:  Patient says she's been more sore today and isn't sure of the cause. Pain: Initial: Pain Intensity 1: 1 /10 Post Session:  2/10   Medications Last Reviewed:  2021  Updated Objective Findings:  None Today  TREATMENT:     Therapeutic Exercise: (40 Minutes):  Exercises per grid below to improve mobility and strength. Required moderate visual, verbal and manual cues to promote proper body alignment, promote proper body posture, promote proper body mechanics and promote proper body breathing techniques. Progressed resistance, range, repetitions and complexity of movement as indicated.    Date:  2021     Activity/Exercise Parameters   Patient Education Plan of care, HEP   Trunk rotation x20   Knee to chest Single; x5 each side   Pelvic tilts Supine; x5 each direction   Posterior pelvic tilt with brace --   Clam shell Red band, 2 x 10 each side; sidelying x 30   Side bend stretch At bar; 2 x 10   Side steps 10 ft x 10   Ball rolls 2 x 10   Bike For ROM; x 5 min   Sit to stand Goblet; blue ball x 30   3-way hip x10 each side   Shuttle DL- 3 cord x 2 min; SL- 2 cord x 1 min each     Manual Therapy (    Soft Tissue Mobilization Duration  Duration: 15 Minutes): Manual techniques to facilitate improved motion and decreased pain. (Used abbreviations: MET - muscle energy technique; PNF - proprioceptive neuromuscular facilitation; NMR - neuromuscular re-education; a/p - anterior to posterior; p/a - posterior to anterior)   · Soft tissue mobilization, lateral hip, left glut  · Long axis hip distraction; left    Treatment/Session Summary:    · Response to Treatment:  Lorraine Gates continues to display improved concentric LE strength today and reports less stiffness at end of session today. · Communication/Consultation:  None today  · Equipment provided today:  None today  · Recommendations/Intent for next treatment session: Next visit will focus on improving pain, strength and mobility. Total Treatment Billable Duration:  55 minutes  PT Patient Time In/Time Out  Time In: 1330  Time Out: Oskar Carl [address]  [place]    Future Appointments   Date Time Provider Emily Tuttle   5/26/2021  1:00 PM [facility]   6/4/2021 10:30 AM Ellie Vazquez [facility]

## 2021-05-26 ENCOUNTER — HOSPITAL ENCOUNTER (OUTPATIENT)
Dept: PHYSICAL THERAPY | Age: 76
Discharge: HOME | End: 2021-05-26
Payer: MEDICARE

## 2021-05-26 PROCEDURE — 97110 THERAPEUTIC EXERCISES: CPT

## 2021-05-26 PROCEDURE — 97140 MANUAL THERAPY 1/> REGIONS: CPT

## 2021-05-26 NOTE — PROGRESS NOTES
Lauren Crook  : 1945  [place]  Secondary: [address] [facility] at [address] [place] [address]  [phone]   [phone]        OUTPATIENT PHYSICAL THERAPY: Daily Treatment Note 2021  Visit Count:  9    ICD-10: Treatment Diagnosis: Low back pain (M54.5), Muscle weakness, generalized (M62.81)  Precautions/Allergies:   Levaquin [levofloxacin], Celebrex [celecoxib], Cephalexin, Codeine, Dilaudid [hydromorphone], Pcn [penicillins], and Prilosec [omeprazole]   TREATMENT PLAN:  Effective Dates: 2021 TO 2021 (90 days). Frequency/Duration: 2 times a week for 90 Day(s)    Pre-treatment Symptoms/Complaints:  Patient reports having some LE soreness after walking a lot the past few days. Pain: Initial: Pain Intensity 1: 1 /10 Post Session:  2/10   Medications Last Reviewed:  2021  Updated Objective Findings:  Pain free ambulation at end of session  TREATMENT:     Therapeutic Exercise: (40 Minutes):  Exercises per grid below to improve mobility and strength. Required moderate visual, verbal and manual cues to promote proper body alignment, promote proper body posture, promote proper body mechanics and promote proper body breathing techniques. Progressed resistance, range, repetitions and complexity of movement as indicated.    Date:  2021     Activity/Exercise Parameters   Patient Education Plan of care, HEP   Trunk rotation x20   Knee to chest Single; x5 each side   Pelvic tilts Supine; x5 each direction   Posterior pelvic tilt with brace --   Clam shell Red band, 2 x 10 each side; sidelying x 30   Side bend stretch At bar; 2 x 10   Side steps 10 ft x 10   Ball rolls --   Bike For ROM; x 5 min   Sit to stand 3 x 10   3-way hip x10 each side   Shuttle DL- 3 cord x 2 min     Manual Therapy (    Soft Tissue Mobilization Duration  Duration: 15 Minutes): Manual techniques to facilitate improved motion and decreased pain. (Used abbreviations: MET - muscle energy technique; PNF - proprioceptive neuromuscular facilitation; NMR - neuromuscular re-education; a/p - anterior to posterior; p/a - posterior to anterior)   · Soft tissue mobilization, lateral hip, left glut  · Long axis hip distraction; left    Treatment/Session Summary:    · Response to Treatment:  Exercises mostly held constant today due to patient reports of fatigue/soreness following increased walking with family in town over the weekend. She will follow up again in 2-3 weeks after being out of town. · Communication/Consultation:  None today  · Equipment provided today:  None today  · Recommendations/Intent for next treatment session: Next visit will focus on improving pain, strength and mobility. Total Treatment Billable Duration:  55 minutes  PT Patient Time In/Time Out  Time In: 1300  Time Out: [address] [facility] [address] Vazquez    Future Appointments   Date Time Provider Emily Tuttle   6/4/2021  7:00 PM Tamy Vazquez [facility]

## 2021-06-04 ENCOUNTER — APPOINTMENT (OUTPATIENT)
Dept: PHYSICAL THERAPY | Age: 76
End: 2021-06-04

## 2021-08-03 ENCOUNTER — TRANSCRIBE ORDER (OUTPATIENT)
Dept: SCHEDULING | Age: 76
End: 2021-08-03

## 2021-08-03 DIAGNOSIS — Z12.31 VISIT FOR SCREENING MAMMOGRAM: Primary | ICD-10-CM

## 2021-09-09 ENCOUNTER — HOSPITAL ENCOUNTER (OUTPATIENT)
Dept: MAMMOGRAPHY | Age: 76
Discharge: HOME | End: 2021-09-09
Attending: OBSTETRICS & GYNECOLOGY
Payer: MEDICARE

## 2021-09-09 DIAGNOSIS — Z12.31 VISIT FOR SCREENING MAMMOGRAM: ICD-10-CM

## 2021-09-09 PROCEDURE — 77067 SCR MAMMO BI INCL CAD: CPT

## 2021-11-03 NOTE — THERAPY DISCHARGE
Rosalie Crow  : 1945  [place]  Secondary: Bshsi Generic [address] [facility]  at [address]  [phone]   [phone]          OUTPATIENT PHYSICAL THERAPY:Discontinuation Summary 11/3/21   ICD-10: Treatment Diagnosis: Low back pain (M54.5), Muscle weakness, generalized (M62.81)  Precautions/Allergies:   Levaquin [levofloxacin], Celebrex [celecoxib], Cephalexin, Codeine, Dilaudid [hydromorphone], Pcn [penicillins], and Prilosec [omeprazole]   TREATMENT PLAN:  Effective Dates: 2021 TO 2021 (90 days). Frequency/Duration: 2 times a week for 90 Day(s) MEDICAL/REFERRING DIAGNOSIS:  Low back pain [M54.5]  Other chronic pain [G89.29]   DATE OF ONSET: Acute on chronic  REFERRING PHYSICIAN: Tino London MD MD Orders: Evaluate and Treat  Return MD Appointment: TBD     INITIAL ASSESSMENT:  Ms. Brooklynn Ordoñez presents with acute on chronic low back pain which has worsened since last fall. She reports central low back pain as her chief complaint but also presents with decreased lumbopelvic control and overall LE strength that limits her ADL function. She will benefit from skilled therapy to improve her pain, strength and mobility to return to prior level of function. PROBLEM LIST (Impacting functional limitations):  1. Decreased Strength  2. Decreased ADL/Functional Activities  3. Decreased Balance  4. Increased Pain  5. Decreased Activity Tolerance  6. Decreased Flexibility/Joint Mobility  7. Decreased [place] with Home Exercise Program INTERVENTIONS PLANNED: (Treatment may consist of any combination of the following)  1. Balance Exercise  2. Home Exercise Program (HEP)  3. Manual Therapy  4. Neuromuscular Re-education/Strengthening  5. Therapeutic Exercise/Strengthening     GOALS: (Goals have been discussed and agreed upon with patient.)  Discharge Goals: Time Frame: 90 days  1.  Patient will be independent with home exercise program without assistance from therapist. MET  2. Patient will report DIANDRA score to 5/50 or less to demonstrate improved functional capacity. NOT MET  3. Patient will demonstrate pain free lifting and walking to resume normal ADL function around her home. NOT MET  4. Patient will perform squatting and stairs pain free to demonstrate improved functional mobility. NOT MET         OUTCOME MEASURE:   Tool Used: Modified Oswestry Low Back Pain Questionnaire  Score:  Initial: 11/50 (4/14/21)  Most Recent: X/50 (Date: -- )   Interpretation of Score: Each section is scored on a 0-5 scale, 5 representing the greatest disability. The scores of each section are added together for a total score of 50. Geneva Robertson has been seen in physical therapy from 4/14/21 to 5/26/21 for 9 visits. Treatment has been discontinued at this time due to patient failing to return for additional treatment. The below goals were met prior to discontinuation. Some goals were not met due to self discharge.    Thank you for this referral.

## 2022-03-19 PROBLEM — F33.0 DEPRESSION, MAJOR, RECURRENT, MILD (HCC): Status: ACTIVE | Noted: 2019-06-03

## 2022-03-19 PROBLEM — K31.A0 INTESTINAL METAPLASIA OF GASTRIC MUCOSA: Status: ACTIVE | Noted: 2017-12-22

## 2022-05-04 PROCEDURE — 88305 TISSUE EXAM BY PATHOLOGIST: CPT

## 2022-05-05 ENCOUNTER — HOSPITAL ENCOUNTER (OUTPATIENT)
Dept: LAB | Age: 77
Discharge: HOME | End: 2022-05-05

## 2022-08-03 ENCOUNTER — HOSPITAL ENCOUNTER (OUTPATIENT)
Dept: LAB | Age: 77
Setting detail: SPECIMEN
Discharge: HOME | End: 2022-08-06

## 2022-08-03 PROCEDURE — 88305 TISSUE EXAM BY PATHOLOGIST: CPT

## 2022-08-03 PROCEDURE — 88312 SPECIAL STAINS GROUP 1: CPT

## 2022-08-11 DIAGNOSIS — M48.062 LUMBAR STENOSIS WITH NEUROGENIC CLAUDICATION: ICD-10-CM

## 2022-08-11 DIAGNOSIS — M48.061 FORAMINAL STENOSIS OF LUMBAR REGION: ICD-10-CM

## 2022-08-11 DIAGNOSIS — M54.17 LUMBOSACRAL RADICULOPATHY: ICD-10-CM

## 2022-08-11 DIAGNOSIS — M51.36 DDD (DEGENERATIVE DISC DISEASE), LUMBAR: ICD-10-CM

## 2022-08-11 DIAGNOSIS — M41.20 SCOLIOSIS, OR KYPHOSCOLIOSIS, IDIOPATHIC: ICD-10-CM

## 2022-08-11 DIAGNOSIS — M43.16 SPONDYLOLISTHESIS OF LUMBAR REGION: Primary | ICD-10-CM

## 2022-09-09 ENCOUNTER — OFFICE VISIT (OUTPATIENT)
Dept: ORTHOPEDIC SURGERY | Age: 77
End: 2022-09-09
Payer: MEDICARE

## 2022-09-09 DIAGNOSIS — M54.17 LUMBOSACRAL RADICULOPATHY: Primary | ICD-10-CM

## 2022-09-09 PROCEDURE — 64483 NJX AA&/STRD TFRM EPI L/S 1: CPT | Performed by: PHYSICAL MEDICINE & REHABILITATION

## 2022-09-09 RX ORDER — TRIAMCINOLONE ACETONIDE 40 MG/ML
80 INJECTION, SUSPENSION INTRA-ARTICULAR; INTRAMUSCULAR ONCE
Status: COMPLETED | OUTPATIENT
Start: 2022-09-09 | End: 2022-09-09

## 2022-09-09 RX ADMIN — TRIAMCINOLONE ACETONIDE 80 MG: 40 INJECTION, SUSPENSION INTRA-ARTICULAR; INTRAMUSCULAR at 13:47

## 2022-09-09 NOTE — PROGRESS NOTES
Date: 09/09/22   Name: Ian Fischer    Pre-Procedural Diagnosis:    Diagnosis Orders   1. Lumbosacral radiculopathy  FL NERVE BLOCK LUMBOSACRAL 1ST    triamcinolone acetonide (KENALOG-40) injection 80 mg          Procedure: Selective Nerve Root Blocks (Transforaminal) - Single Level    Precautions: [facility] Precautions spine injections: Patient allergic to shellfish. No contrast administered during this procedure. Possibly allergic to shellfish. The procedure was discussed at length with the patient and informed consent was signed. The patient was placed in a prone position on the fluoroscopy table and the skin was prepped and draped in a routine sterile fashion. The areas to be injected was/were anesthetized with approximately 5 cc of 1% Lidocaine. A 22-gauge 3.5 inch inch spinal needle was carefully advanced under fluoroscopic guidance to the left L5 transforaminal space(s). As above, no contrast administered. Once proper placement was confirmed, 2 cc of 0.25% Marcaine and 80 mg of Kenalog were injected through the spinal needle at that/each site. Fluoroscopic guidance was used intermittently over a 10-minute period to insure proper needle placement and patient safety. A hard copy of the fluoroscopic  images has been placed in the patient's chart. The patient was monitored after the procedure and discharged home in stable fashion.      Resume Meds:  N/A    Laura Catalan MD  09/09/22

## 2022-09-29 ENCOUNTER — HOSPITAL ENCOUNTER (OUTPATIENT)
Dept: MAMMOGRAPHY | Age: 77
Discharge: HOME | End: 2022-10-02
Payer: MEDICARE

## 2022-09-29 DIAGNOSIS — Z12.31 VISIT FOR SCREENING MAMMOGRAM: ICD-10-CM

## 2022-09-29 PROCEDURE — 77063 BREAST TOMOSYNTHESIS BI: CPT

## 2022-10-05 ENCOUNTER — TELEPHONE (OUTPATIENT)
Dept: ORTHOPEDIC SURGERY | Age: 77
End: 2022-10-05

## 2022-10-05 ENCOUNTER — OFFICE VISIT (OUTPATIENT)
Dept: ORTHOPEDIC SURGERY | Age: 77
End: 2022-10-05
Payer: MEDICARE

## 2022-10-05 VITALS — WEIGHT: 114 LBS | HEIGHT: 60 IN | BODY MASS INDEX: 22.38 KG/M2

## 2022-10-05 DIAGNOSIS — S83.91XA SPRAIN OF RIGHT KNEE, UNSPECIFIED LIGAMENT, INITIAL ENCOUNTER: Primary | ICD-10-CM

## 2022-10-05 PROCEDURE — 1123F ACP DISCUSS/DSCN MKR DOCD: CPT | Performed by: ORTHOPAEDIC SURGERY

## 2022-10-05 PROCEDURE — G8400 PT W/DXA NO RESULTS DOC: HCPCS | Performed by: ORTHOPAEDIC SURGERY

## 2022-10-05 PROCEDURE — 99204 OFFICE O/P NEW MOD 45 MIN: CPT | Performed by: ORTHOPAEDIC SURGERY

## 2022-10-05 PROCEDURE — G8484 FLU IMMUNIZE NO ADMIN: HCPCS | Performed by: ORTHOPAEDIC SURGERY

## 2022-10-05 PROCEDURE — G8420 CALC BMI NORM PARAMETERS: HCPCS | Performed by: ORTHOPAEDIC SURGERY

## 2022-10-05 PROCEDURE — 1036F TOBACCO NON-USER: CPT | Performed by: ORTHOPAEDIC SURGERY

## 2022-10-05 PROCEDURE — 1090F PRES/ABSN URINE INCON ASSESS: CPT | Performed by: ORTHOPAEDIC SURGERY

## 2022-10-05 PROCEDURE — G8427 DOCREV CUR MEDS BY ELIG CLIN: HCPCS | Performed by: ORTHOPAEDIC SURGERY

## 2022-10-16 DIAGNOSIS — S83.91XA SPRAIN OF RIGHT KNEE, UNSPECIFIED LIGAMENT, INITIAL ENCOUNTER: ICD-10-CM

## 2022-10-24 ENCOUNTER — OFFICE VISIT (OUTPATIENT)
Dept: ORTHOPEDIC SURGERY | Age: 77
End: 2022-10-24
Payer: MEDICARE

## 2022-10-24 DIAGNOSIS — S83.241D TEAR OF MEDIAL MENISCUS OF RIGHT KNEE, CURRENT, UNSPECIFIED TEAR TYPE, SUBSEQUENT ENCOUNTER: ICD-10-CM

## 2022-10-24 DIAGNOSIS — M17.11 PRIMARY OSTEOARTHRITIS OF RIGHT KNEE: Primary | ICD-10-CM

## 2022-10-24 PROCEDURE — 1036F TOBACCO NON-USER: CPT | Performed by: ORTHOPAEDIC SURGERY

## 2022-10-24 PROCEDURE — G8484 FLU IMMUNIZE NO ADMIN: HCPCS | Performed by: ORTHOPAEDIC SURGERY

## 2022-10-24 PROCEDURE — 99214 OFFICE O/P EST MOD 30 MIN: CPT | Performed by: ORTHOPAEDIC SURGERY

## 2022-10-24 PROCEDURE — G8420 CALC BMI NORM PARAMETERS: HCPCS | Performed by: ORTHOPAEDIC SURGERY

## 2022-10-24 PROCEDURE — 1090F PRES/ABSN URINE INCON ASSESS: CPT | Performed by: ORTHOPAEDIC SURGERY

## 2022-10-24 PROCEDURE — G8400 PT W/DXA NO RESULTS DOC: HCPCS | Performed by: ORTHOPAEDIC SURGERY

## 2022-10-24 PROCEDURE — G8427 DOCREV CUR MEDS BY ELIG CLIN: HCPCS | Performed by: ORTHOPAEDIC SURGERY

## 2022-10-24 PROCEDURE — 1123F ACP DISCUSS/DSCN MKR DOCD: CPT | Performed by: ORTHOPAEDIC SURGERY

## 2022-10-24 NOTE — PROGRESS NOTES
Name: Jason Marks  YOB: 1945  Gender: female  MRN: [id number]            HPI: Jason Marks is a 65 y.o. female seen for right knee problems. She returns and is doing better. She has some discomfort. She has not done any physical therapy. ROS/Meds/PSH/PMH/FH/SH: A ten system review of systems was performed and is negative other than what is in the HPI. Tobacco:  reports that she has never smoked. She has never used smokeless tobacco.  There were no vitals taken for this visit. Physical Examination:  She is an awake alert pleasant female ambulate with an antalgic gait on the right side    The left knee has a range of motion of 0 to 135 degrees  negative Lachman,  negative anterior drawer,   negative posterior drawer  negative pivot. Good tibial step-off,   No varus or valgus laxity at 0 or 30 degrees. Negative lateral joint line tenderness   negative lateral Jacinda. Negative medial joint line tenderness  negative medial Jacinda. No evidence of any posterolateral instability. No patellofemoral pain. Negative compression,   negative apprehension  no effusion. Calves Are non-tender,  neurovascularly patient is intact. Negative Homans. MPFL is non-tender. Patient Can fully extend the knee. Good quad tone  No erythema. Negative Dial test.        The right knee has a range of motion of 0 to 125 degrees  negative Lachman,  negative anterior drawer,   negative posterior drawer  negative pivot. Good tibial step-off,   No varus or valgus laxity at 0 or 30 degrees. Negative lateral joint line tenderness   negative lateral Jacinda. Marked medial joint line tenderness  Equivocal medial Jacinda. No evidence of any posterolateral instability. Moderate patellofemoral pain. Negative compression,   negative apprehension  no effusion. Calves Are non-tender,  neurovascularly patient is intact. Negative Homans. MPFL is non-tender.    Patient Can fully extend the knee. Good quad tone  No erythema. Negative Dial test.        Data Reviewed:        MRI right knee demonstrates the ACL to be intact but strained. PCL is intact. There is degenerative signal in the posterior horn of the medial meniscus without a displaced tear. Lateral meniscus is intact. There is a bone bruise in the medial tibial plateau. No fracture. There is osteoarthritis throughout the right knee with a knee effusion           Impression:   1. Primary osteoarthritis of right knee    2. Tear of medial meniscus of right knee, current, unspecified tear type, subsequent encounter       Bone bruise medial tibial plateau right knee  History of previous right knee stress fracture  Crohn's disease  Hypertension  Osteopenia   osteoarthritis  Questionable renal failure    Plan:   I discussed the problem with the patient. I discussed nonoperative versus operative intervention including injections. We will defer surgery for now. We will defer injections for now. We will pre-CERT her for viscosupplementation. I we will start her in outpatient physical therapy working on strength and motion. I will recheck her back in 6 weeks  4 This is an acute complicated injury    Follow up: Return in about 6 weeks (around 12/5/2022).              Daniela Yi MD

## 2023-02-23 ENCOUNTER — OFFICE VISIT (OUTPATIENT)
Dept: ORTHOPEDIC SURGERY | Age: 78
End: 2023-02-23
Payer: MEDICARE

## 2023-02-23 DIAGNOSIS — M41.20 OTHER IDIOPATHIC SCOLIOSIS, SITE UNSPECIFIED: ICD-10-CM

## 2023-02-23 DIAGNOSIS — M48.062 SPINAL STENOSIS, LUMBAR REGION WITH NEUROGENIC CLAUDICATION: ICD-10-CM

## 2023-02-23 DIAGNOSIS — M43.16 SPONDYLOLISTHESIS, LUMBAR REGION: Primary | ICD-10-CM

## 2023-02-23 DIAGNOSIS — M54.17 RADICULOPATHY, LUMBOSACRAL REGION: ICD-10-CM

## 2023-02-23 PROCEDURE — 1123F ACP DISCUSS/DSCN MKR DOCD: CPT | Performed by: PHYSICIAN ASSISTANT

## 2023-02-23 PROCEDURE — 99214 OFFICE O/P EST MOD 30 MIN: CPT | Performed by: PHYSICIAN ASSISTANT

## 2023-02-23 PROCEDURE — G8420 CALC BMI NORM PARAMETERS: HCPCS | Performed by: PHYSICIAN ASSISTANT

## 2023-02-23 PROCEDURE — G8400 PT W/DXA NO RESULTS DOC: HCPCS | Performed by: PHYSICIAN ASSISTANT

## 2023-02-23 PROCEDURE — G8427 DOCREV CUR MEDS BY ELIG CLIN: HCPCS | Performed by: PHYSICIAN ASSISTANT

## 2023-02-23 PROCEDURE — 1036F TOBACCO NON-USER: CPT | Performed by: PHYSICIAN ASSISTANT

## 2023-02-23 PROCEDURE — 1090F PRES/ABSN URINE INCON ASSESS: CPT | Performed by: PHYSICIAN ASSISTANT

## 2023-02-23 PROCEDURE — G8484 FLU IMMUNIZE NO ADMIN: HCPCS | Performed by: PHYSICIAN ASSISTANT

## 2023-02-23 RX ORDER — GABAPENTIN 100 MG/1
100 CAPSULE ORAL 3 TIMES DAILY
Qty: 90 CAPSULE | Refills: 0 | Status: SHIPPED | OUTPATIENT
Start: 2023-02-23 | End: 2023-03-25

## 2023-02-23 NOTE — LETTER
Emily ESCOBART  1945  MRN [id number]                                              ROOM NUMBER______      Radiographic Studies:    Cervical MRI      Thoracic MRI         Lumbar MRI          Pelvis MRI        CONTRAST    CT Myelogram: _______________   NCS/EMG ________________ ( UE  /  LE )     MRI of ___________________          Other: ____________________      Injections:    KNEE    HIP  Depomedrol _____ mg Euflexxa _____    _______________ TFESI/SNRB  _______________ SI Joint  _______________ WADE    _______________ Facet  _______________Piriformis/ Sciatica      Medications:    Oral Steroids _______________  NSAIDS _______________    Muscle Relaxers _______________  Neurontin/Lyrica _______________    Pain Medicine _______________  Other _______________                       Physical Therapy:    Lumbar     Thoracic      Cervical     Hip       Knee       Shoulder               Traction          Ultrasound          Dry Needling      Referral:    Pain referral:  [facility]   [facility]   Other: ______________________________    Follow-up/ Refer__________________________________________________    Authorization to hold blood thinners:___________________________________

## 2023-02-23 NOTE — PROGRESS NOTES
Name: Marium Buchanan  YOB: 1945  Gender: female  MRN: [id number]    CC: Back Pain (Recheck back pain)         HPI: This is a 65 y.o. female who has low back pain mostly left lumbar radicular symptoms in L5 distribution. We saw her July 2021. MRI scan of the lumbar spine was done and revealed L4-5 moderate to severe stenosis and L5-S1 is in the grade 1 year 2 anterior listhesis with moderate severe stenosis and severe bilateral foraminal stenosis. We discussed lumbar injections at that time. She did not pursue injections, she was taking Tylenol and did home exercise programs. 4/13/22 her pain had exacerbated and she was referred for a left L5 selective nerve root block. She reported over 70% relief of the symptoms after the injection. The injection significantly helped the left leg pain in the left hip pain. 9/9/22 she had repeat Left L5 SNRB. This injection also provided 100% relief but it only lasted about 3 weeks. Her pain has returned it is severe in the back and left side of the back left buttock and lateral hip. She is taking Tylenol, and Kymberly back and body and she does her lumbar home exercises and strengthening exercises quite regularly. She has difficult time at night sleeping because of the pain. It is up to 8 out of 10. AMB PAIN ASSESSMENT 2/23/2023   Severity of Pain 5       Allergies   Allergen Reactions    Levofloxacin Other (See Comments)     Joint pain for 1 year    Celecoxib Hives    Cephalexin Hives     All cephalosporins    Codeine Itching    Hydromorphone Itching    Omeprazole Hives    Penicillins Hives     Anything related. Current Outpatient Medications   Medication Sig Dispense Refill    gabapentin (NEURONTIN) 100 MG capsule Take 1 capsule by mouth 3 times daily for 30 days. Take 1 tablet by mouth at night for 3 nights, then increase to BID morning and night for 3 days, you may taper up to three times a day.  90 capsule 0    acetaminophen (TYLENOL) 500 MG tablet Take 1,000 mg by mouth daily as needed      vitamin D3 (CHOLECALCIFEROL) 125 MCG (5000 UT) TABS tablet Take by mouth daily      clorazepate (TRANXENE) 3.75 MG tablet Take 3.75 mg by mouth daily as needed. cyanocobalamin 500 MCG tablet Take 500 mcg by mouth daily      cycloSPORINE (RESTASIS) 0.05 % ophthalmic emulsion 1 drop 2 times daily      desvenlafaxine succinate (PRISTIQ) 50 MG TB24 extended release tablet Take 50 mg by mouth daily      DULoxetine (CYMBALTA) 30 MG extended release capsule Take 30 mg by mouth daily      famotidine (PEPCID) 20 MG tablet Take 20 mg by mouth daily      fluticasone (FLONASE) 50 MCG/ACT nasal spray 2 sprays by Nasal route daily      lisinopril-hydroCHLOROthiazide (PRINZIDE;ZESTORETIC) 10-12.5 MG per tablet Take 1 tablet by mouth daily      rosuvastatin (CRESTOR) 10 MG tablet Take 10 mg by mouth      Simethicone 125 MG CAPS Take 125 mg by mouth daily as needed      zolpidem (AMBIEN) 5 MG tablet Take 5 mg by mouth. No current facility-administered medications for this visit. Past Surgical History:   Procedure Laterality Date     SECTION      2    CHOLECYSTECTOMY  2018    TOTAL COLECTOMY      Subtotal      Patient Active Problem List    Diagnosis Date Noted    Depression, major, recurrent, mild ([facility] [address] 2019    Hypercholesterolemia     ELI (obstructive sleep apnea)     Intestinal metaplasia of gastric mucosa 2017    [facility]     Osteoporosis 10/19/2016    Multiple renal cysts     Bronchiectasis (HCC)     Anxiety 2016    Fibromyalgia     Hypertension       Tobacco Use: Low Risk     Smoking Tobacco Use: Never    Smokeless Tobacco Use: Never    Passive Exposure: Not on file      Alcohol Use: Not on file     Exam: Patient ambulates with a Trendelenburg gait. There is weakness of the left abductor muscles 3/5. Positive straight leg raise on the left. Tenderness palpation of the left lower lumbar facet.   No peripheral edema. Radiographic Studies:     AP, lateral and spot views of the lumbar spine: 05/02/22     AP view of the lumbar spine which is standing shows a lumbar scoliosis in the curvature is through the T12-L3 segments. It seems to be more of a tilting of the L1 to 1-3 disc spaces. Sagittal view of the lumbar spine shows retrolisthesis L1 to and L2-3. Very degenerative changes at L1 to there is significant disc degeneration through the segment. And then at L5-S1 there is an anterior listhesis. In comparison to a abdominal series that was done in 2017, her degenerative changes and scoliosis has significantly progressed. The AP view of the abdomen that included the spine showed no scoliosis in 2017. X-ray impression: Progression of degenerative changes lumbar spine and scoliosis. There is an anterior listhesis L5-S1. Also the x-rays from March 2021 at [facility] does note a grade 1 anterolisthesis L5-S1 likely due to facet disease and retrolisthesis of L1 on 2 and 2 on 3 and there was just narrowed narrowing and anterior spurring at L1 on 2 but they also did not mention any scoliosis. MRI Results (most recent):  Results from Appointment encounter on 07/09/21    MRI LUMB SPINE WO CONT    Narrative  MR OF THE LUMBAR SPINE WITHOUT CONTRAST. Clinical Indication: Lower back pain and left leg pain    Procedure: Multiplanar and multisequence MR images are performed of the lumbar  spine without gadolinium contrast.    Comparison: No prior    Findings: Acute degenerative endplate changes with near complete loss of disc  space present at L1-L2. There is multilevel degenerative disc signal. Mild  levoscoliosis is present. There is no compression fracture. The marrow signal in  the imaged sacrum is normal. The conus medullaris is normal in morphology and  signal terminating in the expected position at L1-L2. Axial image:    T11-T12: Small left paracentral disc protrusion.  This does not result in  significant central stenosis or foramina narrowing. T12-L1: There is a shallow left posterior lateral disc protrusion. No central  stenosis evident. The foramina are patent. L1-L2: There is a circumferential disc bulge with left greater than right  degenerative facet arthropathy. No central stenosis evident. There is moderate  to severe left foramina narrowing. There is moderate right foramina narrowing. L2-L3: There is a right foraminal disc protrusion. This compresses the  traversing right L3 nerve root in the subarticular zone and results in moderate  to severe right foramina narrowing. There is mild narrowing of the left neural  foramen. L3-L4: Bilateral degenerative facet changes are present with ligamentum flavum  hypertrophy. There is no central stenosis. Moderate bilateral foramina narrowing  is present. L4-L5: Bulky degenerative facet arthropathy and ligamentum flavum hypertrophy  with a circumferential disc bulge. Moderate central spinal stenosis is present. There is moderate to severe right foramina narrowing. There is moderate left  foramina narrowing. L5-S1: There is ligamentum flavum hypertrophy and bulky degenerative facet  arthropathy. A circumferential disc bulge is in place. Grade 1 anterolisthesis  is present at this level. This results in moderate to severe left and moderate  right foramina narrowing. Limited evaluation the paraspinal soft tissues is unremarkable. The hip joints  are unremarkable. Impression  1. Multilevel degenerative disc and facet disease with acute degenerative  endplate changes and near complete loss of the disc space at L1-L2. Moderate to  severe left and moderate right foramina narrowing present. 2. Degenerative grade 1 anterolisthesis at L5-S1 results in moderate to severe  left and moderate right foramina narrowing.   3. Moderate central spinal stenosis with moderate to severe right and moderate  left foramina narrowing at L4-L5 from degenerative disc and facet disease. 4. Additional bilateral foramina narrowing at multiple levels as discussed  above. 5. Mild levoscoliosis. Have independently reviewed the MRI scan of the lumbar spine. L1-2 she does have significant disc degeneration there is moderate severe left foraminal narrowing and moderate right. No significant central stenosis. L4-5 moderate to severe stenosis, more impressive on the right lateral recess and right foramen. L5-S1 is a level of an anterior listhesis. There is moderate severe stenosis especially in the foramen. Assessment/Plan:      ICD-10-CM    1. Spondylolisthesis, lumbar region  M43.16 Ambulatory referral to Physical Therapy     Ambulatory Referral to Spine Injection      2. Spinal stenosis, lumbar region with neurogenic claudication  M48.062 Ambulatory referral to Physical Therapy     Ambulatory Referral to Spine Injection      3. Radiculopathy, lumbosacral region  M54.17 Ambulatory referral to Physical Therapy     Ambulatory Referral to Spine Injection      4. Other idiopathic scoliosis, site unspecified  M41.20 Ambulatory referral to Physical Therapy     Ambulatory Referral to Spine Injection         Spondylolisthesis L4-5 and L5-S1 quite severe stenosis at both of these levels. She has more of a left radiculopathy however the left L5 selective nerve root block has not provided a long enough relief of the symptoms. She is developing weakness of the left abductors. We did discuss further treatment including the role of surgery and I would need to review this with Dr. Noé Wright. She would likely need a L4-S1 fusion however I am concerned because she has some scoliosis above this level. She does not want to entertain surgical intervention at this time. She would like to try another injection. We discussed we could target the L5 nerve from a different distribution and try L4-5 epidural steroid injection and see if this will provide more relief.   We also discussed the role of gabapentin for neuropathic pain. - Physical Therapy was prescribed and will include stretching, strengthening and modalities to promote blood flow, flexibility and core strengthening.  - Neuropathic pain treatment: For neuropathic pain relief the patient was given a prescription and counseled regarding the possibility of risks and complications from this medication.  - Injection: The patient will be referred for a lumbar steroid injection to help reduce the symptoms. The patient understands the risks including hyperglycemia, immunosuppression, meningitis, cerebrospinal fluid leak, epidural hematoma, and reaction to medication. The patient may benefit from additional injections depending on the response. The injection should be a L4-5 epidural steroid injection      I reviewed images with Dr. Turpti Mcfarlane. Surgical intervention with the L4-S1 lumbar fusion possibly consider ALIF at L5-S1. Orders Placed This Encounter   Medications    gabapentin (NEURONTIN) 100 MG capsule     Sig: Take 1 capsule by mouth 3 times daily for 30 days. Take 1 tablet by mouth at night for 3 nights, then increase to BID morning and night for 3 days, you may taper up to three times a day. Dispense:  90 capsule     Refill:  0        Orders Placed This Encounter   Procedures    Ambulatory referral to Physical Therapy    Ambulatory Referral to Spine Injection          4 This is a chronic illness/condition with exacerbation and progression      Return for lumbar injection recheck [facility], 4 weeks after. Gabbie Harley PA-C  02/23/23      Elements of this note were created using speech recognition software. As such, errors of speech recognition may be present.

## 2023-02-23 NOTE — PROGRESS NOTES
An order for PT on the Lumbar Spine has been entered. A referral for spine injection has been ordered.

## 2023-03-08 ENCOUNTER — OFFICE VISIT (OUTPATIENT)
Dept: ORTHOPEDIC SURGERY | Age: 78
End: 2023-03-08

## 2023-03-08 DIAGNOSIS — M54.17 RADICULOPATHY, LUMBOSACRAL REGION: Primary | ICD-10-CM

## 2023-03-08 RX ORDER — TRIAMCINOLONE ACETONIDE 40 MG/ML
100 INJECTION, SUSPENSION INTRA-ARTICULAR; INTRAMUSCULAR ONCE
Status: COMPLETED | OUTPATIENT
Start: 2023-03-08 | End: 2023-03-08

## 2023-03-08 RX ADMIN — TRIAMCINOLONE ACETONIDE 100 MG: 40 INJECTION, SUSPENSION INTRA-ARTICULAR; INTRAMUSCULAR at 16:20

## 2023-03-08 NOTE — PROGRESS NOTES
Date: 03/08/23   Name: Nissa Salazar    Pre-Procedural Diagnosis:    Diagnosis Orders   1. Radiculopathy, lumbosacral region  XR INJ SPINE THER SUBST LUM/SAC W IMG    triamcinolone acetonide (KENALOG-40) injection 100 mg          Procedure: Lumbar Epidural Steroid Injection (WADE)    Precautions: [facility] Precautions spine injections: Patient allergic to shellfish. No contrast administered during this procedure. Possibly allergic or sensitive to shellfish. The procedure was discussed at length with the patient and informed consent was signed. The patient was placed in a prone position on the fluoroscopy table and the skin was prepped and draped in a routine sterile fashion. The area to be injected was anesthetized with approximately 5 cc of 1% Lidocaine. Initially a 22-gauge 3.5 inch spinal needle was carefully advanced under fluoroscopic guidance to the left L4-L5 epidural space. At this time 0.25 cc of omnipaque administered. . Once proper placement was confirmed, 1.5 cc of sterile water and 100 mg of Kenalog were injected through the spinal needle. Fluoroscopic guidance was used intermittently over a 10-minute period to insure proper needle placement and patient safety. A hard copy of the fluoroscopic  images has been placed in the patient's chart. The patient was monitored after the procedure and discharged home without complication. Resume Meds:  N/A   Patient took a dose or 2 of back and body preparation that does contain aspirin. She does not take it consistently. I do not think this was enough to abort the procedure, though I told her that she should avoid taking this medication on a daily basis because it could cause gastrointestinal issues.     Rosario Emanuel MD  03/08/23

## 2023-04-20 ENCOUNTER — OFFICE VISIT (OUTPATIENT)
Dept: ORTHOPEDIC SURGERY | Age: 78
End: 2023-04-20
Payer: MEDICARE

## 2023-04-20 DIAGNOSIS — M48.061 SPINAL STENOSIS, LUMBAR REGION WITHOUT NEUROGENIC CLAUDICATION: ICD-10-CM

## 2023-04-20 DIAGNOSIS — M54.17 LUMBOSACRAL RADICULOPATHY: ICD-10-CM

## 2023-04-20 DIAGNOSIS — M43.16 SPONDYLOLISTHESIS, LUMBAR REGION: ICD-10-CM

## 2023-04-20 DIAGNOSIS — M48.062 SPINAL STENOSIS, LUMBAR REGION WITH NEUROGENIC CLAUDICATION: ICD-10-CM

## 2023-04-20 DIAGNOSIS — M41.20 OTHER IDIOPATHIC SCOLIOSIS, SITE UNSPECIFIED: ICD-10-CM

## 2023-04-20 DIAGNOSIS — M54.17 RADICULOPATHY, LUMBOSACRAL REGION: Primary | ICD-10-CM

## 2023-04-20 DIAGNOSIS — M51.36 OTHER INTERVERTEBRAL DISC DEGENERATION, LUMBAR REGION: ICD-10-CM

## 2023-04-20 PROCEDURE — G8427 DOCREV CUR MEDS BY ELIG CLIN: HCPCS | Performed by: PHYSICIAN ASSISTANT

## 2023-04-20 PROCEDURE — 1123F ACP DISCUSS/DSCN MKR DOCD: CPT | Performed by: PHYSICIAN ASSISTANT

## 2023-04-20 PROCEDURE — G8420 CALC BMI NORM PARAMETERS: HCPCS | Performed by: PHYSICIAN ASSISTANT

## 2023-04-20 PROCEDURE — 99213 OFFICE O/P EST LOW 20 MIN: CPT | Performed by: PHYSICIAN ASSISTANT

## 2023-04-20 PROCEDURE — G8400 PT W/DXA NO RESULTS DOC: HCPCS | Performed by: PHYSICIAN ASSISTANT

## 2023-04-20 PROCEDURE — 1036F TOBACCO NON-USER: CPT | Performed by: PHYSICIAN ASSISTANT

## 2023-04-20 PROCEDURE — 1090F PRES/ABSN URINE INCON ASSESS: CPT | Performed by: PHYSICIAN ASSISTANT

## 2023-04-20 NOTE — PROGRESS NOTES
Name: Jeff Corea  YOB: 1945  Gender: female  MRN: [id number]    CC: Back Pain (Follow up after injection with [facility] 3/8/23)           HPI: This is a 65 y.o. female who has low back pain mostly left lumbar radicular symptoms in L5 distribution. We saw her July 2021. MRI scan of the lumbar spine was done and revealed L4-5 moderate to severe stenosis and L5-S1 is in the grade 1 year 2 anterior listhesis with moderate severe stenosis and severe bilateral foraminal stenosis. We discussed lumbar injections at that time. She did not pursue injections, she was taking Tylenol and did home exercise programs. 4/13/22 her pain had exacerbated and she was referred for a left L5 selective nerve root block. She reported over 70% relief of the symptoms after the injection. The injection significantly helped the left leg pain in the left hip pain. She reports that only lasted a short period of time. 9/9/22 she had repeat Left L5 SNRB. This injection also provided 100% relief but it only lasted about 3 weeks. Her pain has returned it is severe in the back and left side of the back left buttock and lateral hip. She is taking Tylenol, and Kymberly back and body and she does her lumbar home exercises and strengthening exercises quite regularly. She has difficult time at night sleeping because of the pain. It is up to 8 out of 10. We recommended gabapentin. We recommended physical therapy. She did not take the gabapentin or go to physical therapy. We ultimately referred her for L4-5 epidural steroid injection to see if this would provide even more relief. She does report for 3 weeks she had 100% relief but then the symptoms returned. She then seem to have a reaction she thought it might have been an adrenal insufficiency syndrome. She discussed with her PCP. They did not think that that was the case. Basically pain continues and can get up to 4 out of 10 and she takes Tylenol.   She is

## 2023-09-18 ENCOUNTER — OFFICE VISIT (OUTPATIENT)
Dept: OBGYN CLINIC | Age: 78
End: 2023-09-18
Payer: MEDICARE

## 2023-09-18 VITALS
HEIGHT: 57 IN | SYSTOLIC BLOOD PRESSURE: 112 MMHG | WEIGHT: 115 LBS | BODY MASS INDEX: 24.81 KG/M2 | DIASTOLIC BLOOD PRESSURE: 78 MMHG

## 2023-09-18 DIAGNOSIS — Z01.419 ENCOUNTER FOR WELL WOMAN EXAM WITH ROUTINE GYNECOLOGICAL EXAM: Primary | ICD-10-CM

## 2023-09-18 DIAGNOSIS — Z12.4 CERVICAL CANCER SCREENING: ICD-10-CM

## 2023-09-18 DIAGNOSIS — Z12.31 SCREENING MAMMOGRAM, ENCOUNTER FOR: ICD-10-CM

## 2023-09-18 PROCEDURE — 3074F SYST BP LT 130 MM HG: CPT | Performed by: OBSTETRICS & GYNECOLOGY

## 2023-09-18 PROCEDURE — G8427 DOCREV CUR MEDS BY ELIG CLIN: HCPCS | Performed by: OBSTETRICS & GYNECOLOGY

## 2023-09-18 PROCEDURE — G8420 CALC BMI NORM PARAMETERS: HCPCS | Performed by: OBSTETRICS & GYNECOLOGY

## 2023-09-18 PROCEDURE — 3078F DIAST BP <80 MM HG: CPT | Performed by: OBSTETRICS & GYNECOLOGY

## 2023-09-18 PROCEDURE — G0101 CA SCREEN;PELVIC/BREAST EXAM: HCPCS | Performed by: OBSTETRICS & GYNECOLOGY

## 2023-09-18 RX ORDER — AZELASTINE HYDROCHLORIDE 137 UG/1
SPRAY, METERED NASAL
COMMUNITY
Start: 2023-08-21

## 2023-09-18 NOTE — PROGRESS NOTES
KIM Gonzales is a 77 y.o. female seen for annual GYN exam.    Past Medical History, Past Surgical History, Family history, Social History, and Medications were all reviewed with the patient today and updated as necessary. Current Outpatient Medications   Medication Sig    Azelastine HCl 137 MCG/SPRAY SOLN SPRAY 2 SPRAYS INTO EACH NOSTRIL 2 TIMES A DAY. acetaminophen (TYLENOL) 500 MG tablet Take 2 tablets by mouth daily as needed    vitamin D3 (CHOLECALCIFEROL) 125 MCG (5000 UT) TABS tablet Take by mouth daily    clorazepate (TRANXENE) 3.75 MG tablet Take 1 tablet by mouth daily as needed. cyanocobalamin 500 MCG tablet Take 1 tablet by mouth daily    cycloSPORINE (RESTASIS) 0.05 % ophthalmic emulsion 1 drop 2 times daily    desvenlafaxine succinate (PRISTIQ) 50 MG TB24 extended release tablet Take 1 tablet by mouth daily    DULoxetine (CYMBALTA) 30 MG extended release capsule Take 1 capsule by mouth daily    famotidine (PEPCID) 20 MG tablet Take 1 tablet by mouth daily    lisinopril-hydroCHLOROthiazide (PRINZIDE;ZESTORETIC) 10-12.5 MG per tablet Take 1 tablet by mouth daily    rosuvastatin (CRESTOR) 10 MG tablet Take 1 tablet by mouth    Simethicone 125 MG CAPS Take 1 capsule by mouth daily as needed    zolpidem (AMBIEN) 5 MG tablet Take 1 tablet by mouth.    gabapentin (NEURONTIN) 100 MG capsule Take 1 capsule by mouth 3 times daily for 30 days. Take 1 tablet by mouth at night for 3 nights, then increase to BID morning and night for 3 days, you may taper up to three times a day. (Patient not taking: Reported on 9/18/2023)    fluticasone (FLONASE) 50 MCG/ACT nasal spray 2 sprays by Nasal route daily (Patient not taking: Reported on 9/18/2023)     No current facility-administered medications for this visit.      Allergies   Allergen Reactions    Levofloxacin Other (See Comments)     Joint pain for 1 year    Adhesive Tape     Celecoxib Hives    Cephalexin Hives     All cephalosporins

## 2023-09-20 LAB
CYTOLOGIST CVX/VAG CYTO: NORMAL
CYTOLOGY CVX/VAG DOC THIN PREP: NORMAL
HPV REFLEX: NORMAL
Lab: NORMAL
PATH REPORT.FINAL DX SPEC: NORMAL
STAT OF ADQ CVX/VAG CYTO-IMP: NORMAL

## 2023-10-14 ENCOUNTER — HOSPITAL ENCOUNTER (OUTPATIENT)
Dept: MAMMOGRAPHY | Age: 78
End: 2023-10-14
Attending: OBSTETRICS & GYNECOLOGY
Payer: MEDICARE

## 2023-10-14 DIAGNOSIS — Z12.31 SCREENING MAMMOGRAM, ENCOUNTER FOR: ICD-10-CM

## 2023-10-14 PROCEDURE — 77063 BREAST TOMOSYNTHESIS BI: CPT

## 2024-10-14 ENCOUNTER — TRANSCRIBE ORDERS (OUTPATIENT)
Dept: SCHEDULING | Age: 79
End: 2024-10-14

## 2024-10-14 DIAGNOSIS — Z12.31 VISIT FOR SCREENING MAMMOGRAM: Primary | ICD-10-CM

## 2024-11-26 ENCOUNTER — HOSPITAL ENCOUNTER (OUTPATIENT)
Dept: MAMMOGRAPHY | Age: 79
Discharge: HOME | End: 2024-11-29
Attending: INTERNAL MEDICINE
Payer: MEDICARE

## 2024-11-26 DIAGNOSIS — Z12.31 VISIT FOR SCREENING MAMMOGRAM: ICD-10-CM

## 2024-11-26 PROCEDURE — 77063 BREAST TOMOSYNTHESIS BI: CPT

## 2025-01-16 ENCOUNTER — OFFICE VISIT (OUTPATIENT)
Dept: OBGYN CLINIC | Age: 80
End: 2025-01-16
Payer: MEDICARE

## 2025-01-16 VITALS — WEIGHT: 116 LBS | BODY MASS INDEX: 25.1 KG/M2 | DIASTOLIC BLOOD PRESSURE: 70 MMHG | SYSTOLIC BLOOD PRESSURE: 116 MMHG

## 2025-01-16 DIAGNOSIS — N64.4 BREAST PAIN, LEFT: Primary | ICD-10-CM

## 2025-01-16 PROCEDURE — G8428 CUR MEDS NOT DOCUMENT: HCPCS | Performed by: OBSTETRICS & GYNECOLOGY

## 2025-01-16 PROCEDURE — G8399 PT W/DXA RESULTS DOCUMENT: HCPCS | Performed by: OBSTETRICS & GYNECOLOGY

## 2025-01-16 PROCEDURE — G8419 CALC BMI OUT NRM PARAM NOF/U: HCPCS | Performed by: OBSTETRICS & GYNECOLOGY

## 2025-01-16 PROCEDURE — 3078F DIAST BP <80 MM HG: CPT | Performed by: OBSTETRICS & GYNECOLOGY

## 2025-01-16 PROCEDURE — 99214 OFFICE O/P EST MOD 30 MIN: CPT | Performed by: OBSTETRICS & GYNECOLOGY

## 2025-01-16 PROCEDURE — 1090F PRES/ABSN URINE INCON ASSESS: CPT | Performed by: OBSTETRICS & GYNECOLOGY

## 2025-01-16 PROCEDURE — 3074F SYST BP LT 130 MM HG: CPT | Performed by: OBSTETRICS & GYNECOLOGY

## 2025-01-16 PROCEDURE — 1123F ACP DISCUSS/DSCN MKR DOCD: CPT | Performed by: OBSTETRICS & GYNECOLOGY

## 2025-01-16 PROCEDURE — 1036F TOBACCO NON-USER: CPT | Performed by: OBSTETRICS & GYNECOLOGY

## 2025-01-16 NOTE — PROGRESS NOTES
KIM Clemente is a 79 y.o. female seen for left breast pain.  She describes the pain as being a sharp shooting pain that lasts a few seconds.  She did have a normal mammogram in Nov.      Past Medical History, Past Surgical History, Family history, Social History, and Medications were all reviewed with the patient today and updated as necessary.     Current Outpatient Medications   Medication Sig    acetaminophen (TYLENOL) 500 MG tablet Take 2 tablets by mouth daily as needed    vitamin D3 (CHOLECALCIFEROL) 125 MCG (5000 UT) TABS tablet Take by mouth daily    clorazepate (TRANXENE) 3.75 MG tablet Take 1 tablet by mouth daily as needed.    cyanocobalamin 500 MCG tablet Take 1 tablet by mouth daily    cycloSPORINE (RESTASIS) 0.05 % ophthalmic emulsion 1 drop 2 times daily    desvenlafaxine succinate (PRISTIQ) 50 MG TB24 extended release tablet Take 1 tablet by mouth daily    DULoxetine (CYMBALTA) 30 MG extended release capsule Take 1 capsule by mouth daily    famotidine (PEPCID) 20 MG tablet Take 1 tablet by mouth daily    fluticasone (FLONASE) 50 MCG/ACT nasal spray 2 sprays by Nasal route daily    lisinopril-hydroCHLOROthiazide (PRINZIDE;ZESTORETIC) 10-12.5 MG per tablet Take 1 tablet by mouth daily    rosuvastatin (CRESTOR) 10 MG tablet Take 1 tablet by mouth    Simethicone 125 MG CAPS Take 1 capsule by mouth daily as needed    zolpidem (AMBIEN) 5 MG tablet Take 1 tablet by mouth.    Azelastine HCl 137 MCG/SPRAY SOLN SPRAY 2 SPRAYS INTO EACH NOSTRIL 2 TIMES A DAY. (Patient not taking: Reported on 1/16/2025)    gabapentin (NEURONTIN) 100 MG capsule Take 1 capsule by mouth 3 times daily for 30 days. Take 1 tablet by mouth at night for 3 nights, then increase to BID morning and night for 3 days, you may taper up to three times a day. (Patient not taking: Reported on 9/18/2023)     No current facility-administered medications for this visit.     Allergies   Allergen Reactions    Levofloxacin Other (See

## 2025-01-28 ENCOUNTER — HOSPITAL ENCOUNTER (OUTPATIENT)
Dept: MAMMOGRAPHY | Age: 80
Discharge: HOME | End: 2025-01-31
Attending: OBSTETRICS & GYNECOLOGY
Payer: MEDICARE

## 2025-01-28 DIAGNOSIS — N64.4 BREAST PAIN, LEFT: ICD-10-CM

## 2025-01-28 PROCEDURE — 76642 ULTRASOUND BREAST LIMITED: CPT

## (undated) DEVICE — SUTURE VCRL SZ 3-0 L27IN ABSRB UD L26MM SH 1/2 CIR J416H

## (undated) DEVICE — REM POLYHESIVE ADULT PATIENT RETURN ELECTRODE: Brand: VALLEYLAB

## (undated) DEVICE — (D)STRIP SKN CLSR 0.5X4IN WHT --

## (undated) DEVICE — UNIVERSAL FIXATION CANNULA: Brand: VERSAONE

## (undated) DEVICE — INTENDED FOR TISSUE SEPARATION, AND OTHER PROCEDURES THAT REQUIRE A SHARP SURGICAL BLADE TO PUNCTURE OR CUT.: Brand: BARD-PARKER SAFETY BLADES SIZE 11, STERILE

## (undated) DEVICE — SOL ANTI-FOG 6ML MEDC -- MEDICHOICE - CONVERT TO 358427

## (undated) DEVICE — BLUNT TROCAR WITH THREADED ANCHOR: Brand: VERSAONE

## (undated) DEVICE — CONTAINER SPEC FRMLN 120ML --

## (undated) DEVICE — CATHETER CHOLGM 4.5FR L18IN W/ MTL SUPP TB

## (undated) DEVICE — 2000CC GUARDIAN II: Brand: GUARDIAN

## (undated) DEVICE — BLADELESS OPTICAL TROCAR WITH FIXATION CANNULA: Brand: VERSAPORT

## (undated) DEVICE — SOLUTION IRRIG 3000ML 0.9% SOD CHL FLX CONT 0797208] ICU MEDICAL INC]

## (undated) DEVICE — SPECIMEN RETRIEVAL POUCH: Brand: ENDO CATCH GOLD

## (undated) DEVICE — [HIGH FLOW INSUFFLATOR,  DO NOT USE IF PACKAGE IS DAMAGED,  KEEP DRY,  KEEP AWAY FROM SUNLIGHT,  PROTECT FROM HEAT AND RADIOACTIVE SOURCES.]: Brand: PNEUMOSURE

## (undated) DEVICE — GOWN,REINFORCED,POLY,AURORA,XXLARGE,STR: Brand: MEDLINE

## (undated) DEVICE — NEEDLE HYPO 21GA L1.5IN INTRAMUSCULAR S STL LATCH BVL UP

## (undated) DEVICE — MASTISOL ADHESIVE LIQ 2/3ML

## (undated) DEVICE — LAP CHOLE: Brand: MEDLINE INDUSTRIES, INC.

## (undated) DEVICE — VISUALIZATION SYSTEM: Brand: CLEARIFY

## (undated) DEVICE — SOLUTION IV 1000ML 0.9% SOD CHL

## (undated) DEVICE — (D)PREP SKN CHLRAPRP APPL 26ML -- CONVERT TO ITEM 371833

## (undated) DEVICE — CLIP APPLIER WITH CLIP LOGIC TECHNOLOGY: Brand: ENDO CLIP III

## (undated) DEVICE — 2, DISPOSABLE SUCTION/IRRIGATOR WITHOUT DISPOSABLE TIP: Brand: STRYKEFLOW

## (undated) DEVICE — SUTURE SZ 0 27IN 5/8 CIR UR-6  TAPER PT VIOLET ABSRB VICRYL J603H